# Patient Record
Sex: MALE | Race: WHITE | NOT HISPANIC OR LATINO | Employment: FULL TIME | URBAN - METROPOLITAN AREA
[De-identification: names, ages, dates, MRNs, and addresses within clinical notes are randomized per-mention and may not be internally consistent; named-entity substitution may affect disease eponyms.]

---

## 2018-06-26 ENCOUNTER — OFFICE VISIT (OUTPATIENT)
Dept: FAMILY MEDICINE CLINIC | Facility: CLINIC | Age: 33
End: 2018-06-26
Payer: COMMERCIAL

## 2018-06-26 VITALS
TEMPERATURE: 97.5 F | DIASTOLIC BLOOD PRESSURE: 76 MMHG | RESPIRATION RATE: 16 BRPM | HEIGHT: 69 IN | WEIGHT: 153.6 LBS | SYSTOLIC BLOOD PRESSURE: 118 MMHG | BODY MASS INDEX: 22.75 KG/M2 | HEART RATE: 67 BPM

## 2018-06-26 DIAGNOSIS — L29.0 PRURITUS ANI: ICD-10-CM

## 2018-06-26 DIAGNOSIS — Z00.00 ANNUAL PHYSICAL EXAM: Primary | ICD-10-CM

## 2018-06-26 PROCEDURE — 99395 PREV VISIT EST AGE 18-39: CPT | Performed by: FAMILY MEDICINE

## 2018-06-26 NOTE — ASSESSMENT & PLAN NOTE
Normal physical exam   advised to cut down on chocolate   hydrocortisone cream p r n      follow-up in 2 weeks in up if no improvement

## 2018-06-26 NOTE — PROGRESS NOTES
Assessment/Plan:    Problem List Items Addressed This Visit        Musculoskeletal and Integument    Pruritus ani      Normal physical exam   advised to cut down on chocolate   hydrocortisone cream p r n      follow-up in 2 weeks in up if no improvement            Other    Annual physical exam - Primary      Health  Male  follow-up in a year               Subjective:      Patient ID: Ambrocio Sanford is a 27 y o  male  26-year-old male with no medical history here for annual physical    Patient has been complaining of anal itchiness  for 7 days  Each and has occurs at night and last for 30 minutes  She has been applying Vaseline with some relief  No bleeding  No pain  No bloating  No diarrhea  No constipation    Recently patient has been eating much more chocolate than usual    he does not drink alcohol  No cigarette smoking  No illegal drugs  Sexually active with his wife  Has had previous  HIV test   Does not want to be checked now  family history of colon cancer in grandfather at the age of [de-identified]        The following portions of the patient's history were reviewed and updated as appropriate: allergies, current medications, past family history, past medical history, past social history, past surgical history and problem list     Review of Systems   Constitutional: Negative for activity change, appetite change, chills and fever  HENT: Negative for congestion  Respiratory: Negative for chest tightness  Cardiovascular: Negative for chest pain  Gastrointestinal: Negative for abdominal distention, abdominal pain, anal bleeding, blood in stool, constipation, diarrhea, nausea, rectal pain and vomiting  Rectal  itchiness   Musculoskeletal: Negative for arthralgias  Neurological: Negative for dizziness and headaches  Hematological: Negative for adenopathy  Psychiatric/Behavioral: Negative for agitation           Objective:    Vitals:    06/26/18 1515   BP: 118/76   Pulse: 67   Resp: 16   Temp: 97 5 °F (36 4 °C)        Physical Exam   Constitutional: He is oriented to person, place, and time  He appears well-developed and well-nourished  No distress  HENT:   Head: Normocephalic  Mouth/Throat: Oropharynx is clear and moist  No oropharyngeal exudate  Eyes: Conjunctivae are normal  Pupils are equal, round, and reactive to light  Neck: Normal range of motion  Cardiovascular: Normal rate, regular rhythm, normal heart sounds and intact distal pulses  Exam reveals no gallop and no friction rub  No murmur heard  Pulmonary/Chest: Effort normal and breath sounds normal  No respiratory distress  He has no wheezes  He has no rales  He exhibits no tenderness  Abdominal: Soft  He exhibits no distension and no mass  There is no tenderness  There is no rebound and no guarding  Genitourinary: Rectum normal and prostate normal  Rectal exam shows no external hemorrhoid, no internal hemorrhoid, no fissure, no mass, no tenderness and anal tone normal  Prostate is not enlarged and not tender  Musculoskeletal: Normal range of motion  He exhibits no edema, tenderness or deformity  Lymphadenopathy:     He has no cervical adenopathy  Neurological: He is alert and oriented to person, place, and time  Skin: Skin is warm and dry  No rash noted  He is not diaphoretic  No pallor  Psychiatric: He has a normal mood and affect  Vitals reviewed

## 2021-03-31 ENCOUNTER — TELEPHONE (OUTPATIENT)
Dept: OBGYN CLINIC | Facility: HOSPITAL | Age: 36
End: 2021-03-31

## 2021-03-31 ENCOUNTER — APPOINTMENT (OUTPATIENT)
Dept: RADIOLOGY | Age: 36
End: 2021-03-31
Payer: OTHER MISCELLANEOUS

## 2021-03-31 ENCOUNTER — TELEPHONE (OUTPATIENT)
Dept: OBGYN CLINIC | Facility: OTHER | Age: 36
End: 2021-03-31

## 2021-03-31 ENCOUNTER — OCCMED (OUTPATIENT)
Dept: URGENT CARE | Age: 36
End: 2021-03-31
Payer: OTHER MISCELLANEOUS

## 2021-03-31 DIAGNOSIS — S99.922A FOOT INJURY, LEFT, INITIAL ENCOUNTER: Primary | ICD-10-CM

## 2021-03-31 DIAGNOSIS — S99.922A FOOT INJURY, LEFT, INITIAL ENCOUNTER: ICD-10-CM

## 2021-03-31 PROCEDURE — 99283 EMERGENCY DEPT VISIT LOW MDM: CPT | Performed by: NURSE PRACTITIONER

## 2021-03-31 PROCEDURE — G0382 LEV 3 HOSP TYPE B ED VISIT: HCPCS | Performed by: NURSE PRACTITIONER

## 2021-03-31 PROCEDURE — 73630 X-RAY EXAM OF FOOT: CPT

## 2021-03-31 NOTE — TELEPHONE ENCOUNTER
Spoke to Abhi and scheduled appointment  Called patient, ROSA on VM for him to please call to confirm the appointment is okay with him  Please confirm the appointment and COVID screen when call returned   Thank you

## 2021-03-31 NOTE — TELEPHONE ENCOUNTER
Patient returned call and confirmed appointment and screened  Patient asked if he can take the boot off and shower prior to his appointment  Spoke with triage nurse, he is to follow the instructions he was given but the jaime  Patient is going to check with jaime

## 2021-03-31 NOTE — TELEPHONE ENCOUNTER
Spoke to [facility]  Gil Robledo reports an open metatarsal fx of the L foot  Stated patient is stabilized  Can you please look at the xray and advise when you would like him seen  This is a crushing WC injury  Thanks      After nurse spoke to Gil Robledo, she called back and reported that this is not an open fx, it is a closed fx  Please advise if you want him seen sooner than next available next week       CB# for Gil Robledo is [phone]    Thanks

## 2021-04-06 ENCOUNTER — OFFICE VISIT (OUTPATIENT)
Dept: OBGYN CLINIC | Facility: CLINIC | Age: 36
End: 2021-04-06
Payer: OTHER MISCELLANEOUS

## 2021-04-06 VITALS
BODY MASS INDEX: 23.19 KG/M2 | HEART RATE: 97 BPM | SYSTOLIC BLOOD PRESSURE: 137 MMHG | DIASTOLIC BLOOD PRESSURE: 85 MMHG | HEIGHT: 68 IN | WEIGHT: 153 LBS

## 2021-04-06 DIAGNOSIS — S92.325A CLOSED NONDISPLACED FRACTURE OF SECOND METATARSAL BONE OF LEFT FOOT, INITIAL ENCOUNTER: ICD-10-CM

## 2021-04-06 DIAGNOSIS — S97.82XA CRUSH INJURY TO FOOT, LEFT, INITIAL ENCOUNTER: Primary | ICD-10-CM

## 2021-04-06 DIAGNOSIS — S92.315A CLOSED NONDISPLACED FRACTURE OF FIRST METATARSAL BONE OF LEFT FOOT, INITIAL ENCOUNTER: ICD-10-CM

## 2021-04-06 PROCEDURE — 28470 CLTX METATARSAL FX WO MNP EA: CPT | Performed by: ORTHOPAEDIC SURGERY

## 2021-04-06 PROCEDURE — 99243 OFF/OP CNSLTJ NEW/EST LOW 30: CPT | Performed by: ORTHOPAEDIC SURGERY

## 2021-04-06 RX ORDER — ASPIRIN 325 MG
325 TABLET, DELAYED RELEASE (ENTERIC COATED) ORAL DAILY
Qty: 30 TABLET | Refills: 0 | Status: SHIPPED | OUTPATIENT
Start: 2021-04-06

## 2021-04-06 NOTE — LETTER
April 6, 2021     Patient: Kiran Hamilton   YOB: 1985   Date of Visit: 4/6/2021       To Whom it May Concern:    Byron Sullivan is under my professional care  He was seen in my office on 4/6/2021  He is not cleared to return to work at this time  We will update his work status at the next evaluation in 2 weeks  If you have any questions or concerns, please don't hesitate to call  Sincerely,          Jarrett Grace MD        CC: Jayson Potts D Florentina Claude

## 2021-04-06 NOTE — PROGRESS NOTES
RUSS Garcia  Attending, Orthopaedic Surgery  [facility] [address] [facility]      ORTHOPAEDIC FOOT AND ANKLE CLINIC VISIT     Assessment:     Encounter Diagnoses   Name Primary?  Crush injury to foot, left, initial encounter Yes    Closed nondisplaced fracture of first metatarsal bone of left foot, initial encounter     Closed nondisplaced fracture of second metatarsal bone of left foot, initial encounter             Plan:   · The patient verbalized understanding of exam findings and treatment plan  We engaged in the shared decision-making process and treatment options were discussed at length with the patient  Surgical and conservative management discussed today along with risks and benefits  · Jenn Lanes is to remain nonweightbearing in the cam boot for another 2 weeks with the use of crutches and cam boot  · Advised due to crush injury there is a possibility of nerve damage and will have burning, tingling, and numbness as the nerve heals- Dorsal medial cutaneus branch of the SPN  · Advised bone healing can take up to 4 months to return to work without restrictions  · Begin taking vitamin D3 and calcium to promote bone healing  · Avoid NSAID's as it hinders bone healing  ·  mg BID for ppx  · Compression stocking was prescribed to be worn to control swelling  · We will see the patient back in 2 weeks for an x-ray and possible return to weightbearing  Return in about 2 weeks (around 4/20/2021) for Recheck, x-ray's    Fracture / Dislocation Treatment    Date/Time: 4/6/2021 9:23 AM  Performed by: González Bautista MD  Authorized by: González Bautista MD     Patient Location:  Clinic  Other Assisting Provider: No    Verbal consent obtained?: Yes    Written consent obtained?: No    Risks and benefits: Risks, benefits and alternatives were discussed    Consent given by:  Patient  Patient states understanding of procedure being performed: Yes    Patient identity confirmed: Verbally with patient  Injury location:  Foot  Location details:  Left foot  Injury type:  Fracture  Fracture type: first metatarsal and second metatarsal    Neurovascular status: Neurovascularly intact    Distal perfusion: normal    Neurological function: normal    Range of motion: normal    Local anesthesia used?: No    Manipulation performed?: No    Immobilization:  Brace and crutches (Cam boot)  Neurovascular status: Neurovascularly intact    Distal perfusion: normal    Neurological function: normal    Range of motion: normal    Patient tolerance:  Patient tolerated the procedure well with no immediate complications          History of Present Illness:   Chief Complaint:   Chief Complaint   Patient presents with    Left Foot - Pain, Fracture     Colon Breath is a 38 y o  male who is being seen for left foot pain  Patient was seen by Junaid on 3/31/21 for a work related injury on 3/3021  Patient states [id number] piece of lumber fell of a cart and fell on his left foot  Patient states he was given a cam boot at [facility] and crutches and to remain nonweightbearing until seen by orthro  Pain is localized at 1st metatarsal with minimal radiating and described as sharp and severe  Patient denies numbness, tingling or radicular pain  Denies history of neuropathy  Patient does not smoke, does not have diabetes and does not take blood thinners  Patient denies family history of anesthesia complications and has not had any complications with anesthesia       Pain/symptom timing:  Worse during the day when active  Pain/symptom context:  Worse with activites and work  Pain/symptom modifying factors:  Rest makes better, activities make worse  Pain/symptom associated signs/symptoms: none    Prior treatment   · NSAIDsYes   · Injections No   · Bracing/Orthotics Yes    · Physical Therapy No     Orthopedic Surgical History:   See Below    Past Medical, Surgical and Social History:  Past Medical History:  has no past medical history on file  Problem List: does not have any pertinent problems on file  Past Surgical History:  has no past surgical history on file  Family History: family history is not on file  Social History:  has no history on file for tobacco, alcohol, and drug  Current Medications: currently has no medications in their medication list   Allergies: has No Known Allergies  Review of Systems:  General- denies fever/chills  HEENT- denies hearing loss or sore throat  Eyes- denies eye pain or visual disturbances, denies red eyes  Respiratory- denies cough or SOB  Cardio- denies chest pain or palpitations  GI- denies abdominal pain  Endocrine- denies urinary frequency  Urinary- denies pain with urination  Musculoskeletal- Negative except noted above  Skin- denies rashes or wounds  Neurological- denies dizziness or headache  Psychiatric- denies anxiety or difficulty concentrating    Physical Exam:   /85   Pulse 97   Ht 5' 8" (1 727 m)   Wt 69 4 kg (153 lb)   BMI 23 26 kg/m²   General/Constitutional: No apparent distress: well-nourished and well developed  Eyes: normal ocular motion  Cardio: RRR, Normal S1S2, No m/r/g  Lymphatic: No appreciable lymphadenopathy  Respiratory: Non-labored breathing, CTA b/l no w/c/r  Vascular: No edema, swelling or tenderness, except as noted in detailed exam   Integumentary: No impressive skin lesions present, except as noted in detailed exam   Neuro: No ataxia or tremors noted  Psych: Normal mood and affect, oriented to person, place and time  Appropriate affect  Musculoskeletal: Normal, except as noted in detailed exam and in HPI      Examination    Left    Gait Antalgic   Musculoskeletal Tender to palpation at 1st metatarsal    Skin Normal       Nails Normal    Range of Motion  15 degrees dorsiflexion, 35 degrees plantarflexion  Subtalar motion: normal    Stability Stable    Muscle Strength 5/5 tibialis anterior  5/5 gastrocnemius-soleus  5/5 posterior tibialis  5/5 peroneal/eversion strength  5/5 EHL  5/5 FHL    Neurologic Normal    Sensation Intact to light touch throughout sural, saphenous, superficial peroneal, deep peroneal and medial/lateral plantar nerve distributions  [place]-Ellis 5 07 filament (10g) testing  deferred  Cardiovascular Brisk capillary refill < 2 seconds,intact DP and PT pulses    Special Tests None      Imaging Studies:   3 views of the left foot were taken, reviewed and interpreted independently that demonstrate 1st and 2nd metatarsal nondisplaced fractures  Reviewed by me personally  Thersa Quay Lachman, MD  Foot & Ankle Surgery   Department of [address] [place] [address]      I personally performed the service  Thersa Quay Lachman, MD    Scribe Attestation    I,:  Kirsten Mattson MA am acting as a scribe while in the presence of the attending physician :       I,:  Jonathan De La Cruz MD personally performed the services described in this documentation    as scribed in my presence :

## 2021-04-20 ENCOUNTER — OFFICE VISIT (OUTPATIENT)
Dept: OBGYN CLINIC | Facility: CLINIC | Age: 36
End: 2021-04-20

## 2021-04-20 ENCOUNTER — APPOINTMENT (OUTPATIENT)
Dept: RADIOLOGY | Facility: AMBULARY SURGERY CENTER | Age: 36
End: 2021-04-20
Attending: ORTHOPAEDIC SURGERY
Payer: OTHER MISCELLANEOUS

## 2021-04-20 ENCOUNTER — TELEPHONE (OUTPATIENT)
Dept: OBGYN CLINIC | Facility: HOSPITAL | Age: 36
End: 2021-04-20

## 2021-04-20 VITALS
SYSTOLIC BLOOD PRESSURE: 128 MMHG | HEART RATE: 65 BPM | DIASTOLIC BLOOD PRESSURE: 89 MMHG | WEIGHT: 153 LBS | BODY MASS INDEX: 23.19 KG/M2 | HEIGHT: 68 IN

## 2021-04-20 DIAGNOSIS — S92.325A CLOSED NONDISPLACED FRACTURE OF SECOND METATARSAL BONE OF LEFT FOOT, INITIAL ENCOUNTER: ICD-10-CM

## 2021-04-20 DIAGNOSIS — S97.82XA CRUSH INJURY TO FOOT, LEFT, INITIAL ENCOUNTER: ICD-10-CM

## 2021-04-20 DIAGNOSIS — S92.315D CLOSED NONDISPLACED FRACTURE OF FIRST METATARSAL BONE OF LEFT FOOT WITH ROUTINE HEALING, SUBSEQUENT ENCOUNTER: ICD-10-CM

## 2021-04-20 DIAGNOSIS — S97.82XD CRUSHING INJURY OF LEFT FOOT, SUBSEQUENT ENCOUNTER: Primary | ICD-10-CM

## 2021-04-20 DIAGNOSIS — S92.315A CLOSED NONDISPLACED FRACTURE OF FIRST METATARSAL BONE OF LEFT FOOT, INITIAL ENCOUNTER: ICD-10-CM

## 2021-04-20 PROCEDURE — 73630 X-RAY EXAM OF FOOT: CPT

## 2021-04-20 PROCEDURE — 99024 POSTOP FOLLOW-UP VISIT: CPT | Performed by: ORTHOPAEDIC SURGERY

## 2021-04-20 NOTE — PATIENT INSTRUCTIONS
Start 50% of weight today, 75% tomorrow and 100% on Friday (progress as tolerated)   Walk with the CAM boot at all times    Continue the aspirin for blood clot prevention    Continue Vitamin D and Calcium for bone healing    Follow up in 2 weeks for repeat Xrays and evaluation

## 2021-04-20 NOTE — TELEPHONE ENCOUNTER
Patient sees Dr Dexter Brannon is calling in from [facility] the  on the claim is wanting to know the last time the patient was seen in the office and if any upcoming appointment are made  He stating that sedimentary work is definitely available for this patient and are able to accommodate anything we request  Also requested office notes from 4/6 to be faxed over to him             Fax# [phone]

## 2021-04-20 NOTE — PROGRESS NOTES
RUSS Abrams  Attending, Orthopaedic Surgery  [facility] [address] [facility]      ORTHOPAEDIC FOOT AND ANKLE CLINIC VISIT     Assessment:     Encounter Diagnoses   Name Primary?  Crushing injury of left foot, subsequent encounter Yes    Closed nondisplaced fracture of first metatarsal bone of left foot with routine healing, subsequent encounter     Closed nondisplaced fracture of second metatarsal bone of left foot, initial encounter             Plan:   · The patient verbalized understanding of exam findings and treatment plan  We engaged in the shared decision-making process and treatment options were discussed at length with the patient  Surgical and conservative management discussed today along with risks and benefits  · Briseyda Lucia has 1st and 2nd metatarsal fractures after a crush injury 3 weeks ago  His weightbearing xrays done today show very good alignment with no displacement of the fractures  · He is to start weightbearing 50% and progress towards full weightbearing with the CAM boot by Friday  · Continue to take  for DVT ppx  · Continue Vitamin D and Calcium  · A new work letter was provided today  Return in about 2 weeks (around 5/4/2021) for new weightbearing xrays left foot upon arrival       History of Present Illness:   Chief Complaint:   Chief Complaint   Patient presents with   Ray Gallomargoth is a 38 y o  male who is being seen in follow-up for left 1st and 2nd metatarsals fractures  When we last saw he we recommended NWB in a cAM boot  Pain has slowly improved  Residual pain is localized at 1st and 2nd metatarsals with minimal radiating and described as sharp and severe        Pain/symptom timing:  Worse during the day when active  Pain/symptom context:  Worse with activites  Pain/symptom modifying factors:  Rest makes better, activities make worse  Pain/symptom associated signs/symptoms: none    Prior treatment   · NSAIDsNo   · Injections No · Bracing/Orthotics Yes    · Physical Therapy No     Orthopedic Surgical History:   See below    Past Medical, Surgical and Social History:  Past Medical History:  has no past medical history on file  Problem List: does not have any pertinent problems on file  Past Surgical History:  has no past surgical history on file  Family History: family history is not on file  Social History:  has no history on file for tobacco, alcohol, and drug  Current Medications: has a current medication list which includes the following prescription(s): aspirin  Allergies: has No Known Allergies  Review of Systems:  General- denies fever/chills  HEENT- denies hearing loss or sore throat  Eyes- denies eye pain or visual disturbances, denies red eyes  Respiratory- denies cough or SOB  Cardio- denies chest pain or palpitations  GI- denies abdominal pain  Endocrine- denies urinary frequency  Urinary- denies pain with urination  Musculoskeletal- Negative except noted above  Skin- denies rashes or wounds  Neurological- denies dizziness or headache  Psychiatric- denies anxiety or difficulty concentrating    Physical Exam:   /89   Pulse 65   Ht 5' 8" (1 727 m)   Wt 69 4 kg (153 lb)   BMI 23 26 kg/m²   General/Constitutional: No apparent distress: well-nourished and well developed  Eyes: normal ocular motion  Lymphatic: No appreciable lymphadenopathy  Respiratory: Non-labored breathing  Vascular: No edema, swelling or tenderness, except as noted in detailed exam   Integumentary: No impressive skin lesions present, except as noted in detailed exam   Neuro: No ataxia or tremors noted  Psych: Normal mood and affect, oriented to person, place and time  Appropriate affect  Musculoskeletal: Normal, except as noted in detailed exam and in HPI      Examination    Left    Gait                NWB   Musculoskeletal Tender to palpation at 1st metatarsal    Skin Normal  +swelling over metatarsals    Nails Normal    Range of Motion Intact DF, intact PF  Subtalar motion: intact    Stability Stable    Muscle Strength Not tested    Neurologic Normal    Sensation  Intact to light touch throughout sural, saphenous, superficial peroneal, deep peroneal and medial/lateral plantar nerve distributions  [place]-Ellis 5 07 filament (10g) testing deferred  Cardiovascular Brisk capillary refill < 2 seconds,intact DP and PT pulses    Special Tests None      Imaging Studies:   No new imaging    3 weightbearing views of the Left foot were obtained, reviewed and interpreted independently which demonstrate unchanged alignment of 1st metatarsal fracture, no interval displacement  Reviewed by me personally  Rockwell Fireman Lachman, MD  Foot & Ankle Surgery   Department of [address] [place] [address]      I personally performed the service  Rockwell Fireman Lachman, MD

## 2021-04-20 NOTE — LETTER
April 20, 2021     Patient: Amirah Murray   YOB: 1985   Date of Visit: 4/20/2021       To Whom it May Concern:    Monster Madera is under my professional care  He was seen in my office on 4/20/2021  He is not cleared to return to work and should be excused from work until reevaluated in my office in 2 weeks  If you have any questions or concerns, please don't hesitate to call  Sincerely,          Peña Joe MD        CC: Sonny Brown   [facility]

## 2021-04-22 ENCOUNTER — TELEPHONE (OUTPATIENT)
Dept: OBGYN CLINIC | Facility: HOSPITAL | Age: 36
End: 2021-04-22

## 2021-04-22 NOTE — TELEPHONE ENCOUNTER
Francisca Serrato, patient nurse  with [facility] is calling for the date of the last appt  Francisca Serrato would like the ovn & work note faxed to her at [phone], info was faxed

## 2021-05-04 ENCOUNTER — APPOINTMENT (OUTPATIENT)
Dept: RADIOLOGY | Facility: AMBULARY SURGERY CENTER | Age: 36
End: 2021-05-04
Attending: ORTHOPAEDIC SURGERY
Payer: OTHER MISCELLANEOUS

## 2021-05-04 ENCOUNTER — OFFICE VISIT (OUTPATIENT)
Dept: OBGYN CLINIC | Facility: CLINIC | Age: 36
End: 2021-05-04

## 2021-05-04 VITALS — WEIGHT: 153 LBS | BODY MASS INDEX: 23.19 KG/M2 | HEIGHT: 68 IN

## 2021-05-04 DIAGNOSIS — S92.315D CLOSED NONDISPLACED FRACTURE OF FIRST METATARSAL BONE OF LEFT FOOT WITH ROUTINE HEALING, SUBSEQUENT ENCOUNTER: ICD-10-CM

## 2021-05-04 DIAGNOSIS — S92.315D CLOSED NONDISPLACED FRACTURE OF FIRST METATARSAL BONE OF LEFT FOOT WITH ROUTINE HEALING, SUBSEQUENT ENCOUNTER: Primary | ICD-10-CM

## 2021-05-04 PROCEDURE — 99024 POSTOP FOLLOW-UP VISIT: CPT | Performed by: ORTHOPAEDIC SURGERY

## 2021-05-04 PROCEDURE — 73630 X-RAY EXAM OF FOOT: CPT

## 2021-05-04 NOTE — LETTER
May 4, 2021     Patient: Fernando Gutiérrez   YOB: 1985   Date of Visit: 5/4/2021       To Whom it May Concern:    Rocio Glaser is under my professional care  He was seen in my office on 5/4/2021  He is not cleared at this time to return to work without restrictions  He is cleared at this time for desk duty only, he is not permitted to perform any work while on his foot  We will see him back in 6 weeks for repeat evaluation  After a crush injury to a foot, it it typical for return to work without restrictions at 4 months after the injury (8/1/21)  If you have any questions or concerns, please don't hesitate to call  Sincerely,          Arlys Cogan, MD        CC: Fernando Rivera

## 2021-05-04 NOTE — PROGRESS NOTES
RUSS Riojas  Attending, Orthopaedic Surgery  [facility] [address] [facility]      ORTHOPAEDIC FOOT AND ANKLE CLINIC VISIT     Assessment:     Encounter Diagnosis   Name Primary?  Closed nondisplaced fracture of first metatarsal bone of left foot with routine healing, subsequent encounter Yes            Plan:   · The patient verbalized understanding of exam findings and treatment plan  We engaged in the shared decision-making process and treatment options were discussed at length with the patient  Surgical and conservative management discussed today along with risks and benefits  · Nonoperative treatment for left 1st and 2nd MT fractures  · Xrays stable today  Now 5 weeks from injury  · Protocol provided to begin weaning boot in 2 weeks and transitioning to a supportive sneaker  · Work note provided  · See back in 6 weeks for another Xray  History of Present Illness:   Chief Complaint: Left foot fractures  Sushma Burns is a 38 y o  male who is being seen in follow-up for left 1st and 2nd MT fractures  When we last saw he we recommended return to weightbearing protocol  Pain has continued to improve  Residual pain is localized at fracture sites with minimal radiating and described as occasional       Pain/symptom timing:  Worse during the day when active  Pain/symptom context:  Worse with activites and work  Pain/symptom modifying factors:  Rest makes better, activities make worse  Pain/symptom associated signs/symptoms: none    Prior treatment   · NSAIDsYes   · Injections No   · Bracing/Orthotics Yes    · Physical Therapy No     Orthopedic Surgical History:   See below    Past Medical, Surgical and Social History:  Past Medical History:  has no past medical history on file  Problem List: does not have any pertinent problems on file  Past Surgical History:  has no past surgical history on file  Family History: family history is not on file    Social History:  has no history on file for tobacco, alcohol, and drug  Current Medications: has a current medication list which includes the following prescription(s): aspirin  Allergies: has No Known Allergies  Review of Systems:  General- denies fever/chills  HEENT- denies hearing loss or sore throat  Eyes- denies eye pain or visual disturbances, denies red eyes  Respiratory- denies cough or SOB  Cardio- denies chest pain or palpitations  GI- denies abdominal pain  Endocrine- denies urinary frequency  Urinary- denies pain with urination  Musculoskeletal- Negative except noted above  Skin- denies rashes or wounds  Neurological- denies dizziness or headache  Psychiatric- denies anxiety or difficulty concentrating    Physical Exam:   There were no vitals taken for this visit  General/Constitutional: No apparent distress: well-nourished and well developed  Eyes: normal ocular motion  Lymphatic: No appreciable lymphadenopathy  Respiratory: Non-labored breathing  Vascular: No edema, swelling or tenderness, except as noted in detailed exam   Integumentary: No impressive skin lesions present, except as noted in detailed exam   Neuro: No ataxia or tremors noted  Psych: Normal mood and affect, oriented to person, place and time  Appropriate affect  Musculoskeletal: Normal, except as noted in detailed exam and in HPI  Examination    Left    Gait Antalgic   Musculoskeletal Tender to palpation at 1st MT neck area    Skin Normal       Nails Normal    Range of Motion  20 degrees dorsiflexion, 30 degrees plantarflexion  Subtalar motion: normal    Stability Stable    Muscle Strength 5/5 tibialis anterior  5/5 gastrocnemius-soleus  5/5 posterior tibialis  5/5 peroneal/eversion strength  5/5 EHL  5/5 FHL    Neurologic Normal    Sensation  Intact to light touch throughout sural, saphenous, superficial peroneal, deep peroneal and medial/lateral plantar nerve distributions  [place]-Ellis 5 07 filament (10g) testing deferred      Cardiovascular Brisk capillary refill < 2 seconds,intact DP and PT pulses    Special Tests None      Imaging Studies:   3 views of the Left foot were obtained, reviewed and interpreted independently which demonstrate 1st and 2nd MT fractures without interval displacement  Reviewed by me personally  Amanda Chroman Lachman, MD  Foot & Ankle Surgery   Department of [address] [place] [address]      I personally performed the service  Amanda Chroman Lachman, MD

## 2021-05-04 NOTE — PATIENT INSTRUCTIONS
3 weeks of weightbearing as tolerated in the CAM boot    You may begin weaning your boot and transitioning to a sneaker on 5/24  It is important to do this gradually to avoid aggravating the healing process  1  5/24, you may come out of the boot into a sneaker for 2 hours  2  5/25, you may come out of the boot into a sneaker for 4 hours,  3  The next day, you may come out of the boot into a sneaker for 6 hours  4  Continue this (adding 2 hours per day) as you tolerate  For example, if you do 6 hours out of the boot into a sneaker and your foot swells more than usual at night and it is difficult to control the discomfort, do not advance to 8 hours the next day, stay at 6 hours until you are able to tolerate it  Elevation, Ice and tylenol and staying off of it at night will be important to aide in this transition out of the boot  Swelling and soreness are normal as you begin to do more with the injured leg  Recommend taking the following supplements: Vitamin D 4000 units per day and Calcium 1200 mg per day  This will help with bone healing  Avoid NSAIDs like motrin/aleve  Compression stocking to aide in swelling control  20-30mm Hg pressure-Knee high  Continue to elevate and Ice as needed for swelling control    PT to begin immediately    Return to clinic in 6 weeks for repeat Xrays weightbearing

## 2021-05-06 ENCOUNTER — EVALUATION (OUTPATIENT)
Dept: PHYSICAL THERAPY | Facility: REHABILITATION | Age: 36
End: 2021-05-06
Payer: OTHER MISCELLANEOUS

## 2021-05-06 DIAGNOSIS — S92.315D CLOSED NONDISPLACED FRACTURE OF FIRST METATARSAL BONE OF LEFT FOOT WITH ROUTINE HEALING, SUBSEQUENT ENCOUNTER: Primary | ICD-10-CM

## 2021-05-06 PROCEDURE — 97110 THERAPEUTIC EXERCISES: CPT | Performed by: PHYSICAL THERAPIST

## 2021-05-06 PROCEDURE — 97161 PT EVAL LOW COMPLEX 20 MIN: CPT | Performed by: PHYSICAL THERAPIST

## 2021-05-06 NOTE — PROGRESS NOTES
PT Evaluation     Today's date: 2021  Patient name: Hayes Eric  : 1985  MRN: [id number]  Referring provider: Krystal Rick MD  Dx:   Encounter Diagnosis     ICD-10-CM    1  Closed nondisplaced fracture of first metatarsal bone of left foot with routine healing, subsequent encounter  S99 315D Ambulatory referral to Physical Therapy                  Assessment  Assessment details: Pt is a pleasant 38 y o  male presenting to outpatient physical therapy with Closed nondisplaced fracture of first metatarsal bone of left foot with routine healing, subsequent encounter  (primary encounter diagnosis)   Pt presents with pain, decreased range of motion, decreased strength, abnormal gait mechanics, impaired static and dynamic balance, as well as decreased tolerance to activity  Pt is a good candidate for outpatient physical therapy and would benefit from skilled physical therapy to address limitations and to achieve goals  Thank you for this referral    Impairments: abnormal coordination, abnormal gait, abnormal or restricted ROM, activity intolerance, impaired balance, impaired physical strength, pain with function and weight-bearing intolerance  Understanding of Dx/Px/POC: good   Prognosis: good    Goals  ST  Patient will be able to ambulate community distances without limitation or gait abnormalities in 4 weeks  2  Patient will demonstrate 25% improvement in ROM in 4 weeks  3  Patient will demonstrate 1/2 grade improvement in strength in 4 weeks  LT  Patient will be able to perform IADLS without restriction or pain by discharge  2  Patient will be independent in HEP by discharge  3  Patient will be able to return to recreational/work duties without restriction or pain by discharge        Plan  Patient would benefit from: PT eval and skilled PT  Planned modality interventions: cryotherapy and thermotherapy: hydrocollator packs  Planned therapy interventions: IADL retraining, body mechanics training, flexibility, functional ROM exercises, home exercise program, neuromuscular re-education, manual therapy, postural training, strengthening, stretching, therapeutic activities, therapeutic exercise, joint mobilization, balance/weight bearing training and patient education  Frequency: 2x week  Duration in visits: 8  Duration in weeks: 4  Treatment plan discussed with: patient        Subjective Evaluation    History of Present Illness  Mechanism of injury: 21  Pt comes to therapy reporting injury to L foot while at work  States he was examined at urgent care the following day, where fractures were revealed  Followed up with orthopedic physician  Recently cleared and advised to ambulate in CAM boot as tolerated without crutches  Reports mild pain along dorsum of foot  Denies paresthesias      Pain  Current pain ratin  At worst pain rating: 3    Patient Goals  Patient goals for therapy: decreased pain, increased motion, independence with ADLs/IADLs, increased strength, improved balance and return to work          Objective     Active Range of Motion     Right Ankle/Foot   Dorsiflexion (ke): 20 degrees   Plantar flexion: 38 degrees   Inversion: 30 degrees   Eversion: -5 degrees     Passive Range of Motion     Right Ankle/Foot    Dorsiflexion (ke): 30 degrees   Plantar flexion: 40 degrees   Inversion: 35 degrees   Eversion: 10 degrees     Strength/Myotome Testing     Right Ankle/Foot   Dorsiflexion: 4+  Plantar flexion: 4  Inversion: 4-  Eversion: 4-    Tests     Additional Tests Details  21  Gait - CAM boot full weight             Precautions: Weight-bearing protocol     Daily Treatment Diary     Date             FOTO IE             Re-eval IE              Manuals             PROM ankle                                                    Neuro Re-Ed     rockerboard              Biodex-[facility]             Biodex-             Tandem balance Ther Ex    BAPS             TB ankle all HEP            Prone ankle PF c weight                                       Ther Activity    bike                          Gait Training                              Modalities    CP PRN

## 2021-05-07 ENCOUNTER — TELEPHONE (OUTPATIENT)
Dept: OBGYN CLINIC | Facility: HOSPITAL | Age: 36
End: 2021-05-07

## 2021-05-07 NOTE — TELEPHONE ENCOUNTER
Patient sees Dr Lizzy Anand from [facility] requesting work letter status to please be fax  Fax # [phone]    I already sent OVN from 5/04

## 2021-05-11 ENCOUNTER — OFFICE VISIT (OUTPATIENT)
Dept: PHYSICAL THERAPY | Facility: REHABILITATION | Age: 36
End: 2021-05-11
Payer: OTHER MISCELLANEOUS

## 2021-05-11 DIAGNOSIS — S92.315D CLOSED NONDISPLACED FRACTURE OF FIRST METATARSAL BONE OF LEFT FOOT WITH ROUTINE HEALING, SUBSEQUENT ENCOUNTER: Primary | ICD-10-CM

## 2021-05-11 PROCEDURE — 97140 MANUAL THERAPY 1/> REGIONS: CPT

## 2021-05-11 PROCEDURE — 97110 THERAPEUTIC EXERCISES: CPT

## 2021-05-11 PROCEDURE — 97112 NEUROMUSCULAR REEDUCATION: CPT

## 2021-05-11 NOTE — PROGRESS NOTES
Daily Note     Today's date: 2021  Patient name: Enma Cha  : 1985  MRN: [id number]  Referring provider: Aruna Kaba MD  Dx:   Encounter Diagnosis     ICD-10-CM    1  Closed nondisplaced fracture of first metatarsal bone of left foot with routine healing, subsequent encounter  [id number]                   Subjective: pt presents to therapy with CAM boot reporting pain with WB only  He noted compliance with HEP and denied adverse reaction following last visit  Objective: See treatment diary below      Assessment: Tolerated addition of exercises and treatment well  Patient demonstrated fatigue post treatment, exhibited good technique with therapeutic exercises and would benefit from continued PT      Plan: Continue per plan of care  Progress treatment as tolerated         Precautions: Weight-bearing protocol     Daily Treatment Diary     Date            FOTO IE             Re-eval IE              Manuals            PROM ankle  TE                                                  Neuro Re-Ed     rockerboard   x20 ea PF/DF  M/L           Biodex-LOS  1x static 1x L12           Biodex-WS  PF/DF x2 L12  M/L L 12 x2           Tandem balance  EO x30" ea EC 2x30" ea                                                  Ther Ex    BAPS  L2 x20 ea           TB ankle all HEP            Prone ankle PF c weight  nv                                     Ther Activity    bike  5 min                        Gait Training                              Modalities    CP PRN

## 2021-05-13 ENCOUNTER — TELEPHONE (OUTPATIENT)
Dept: OBGYN CLINIC | Facility: HOSPITAL | Age: 36
End: 2021-05-13

## 2021-05-13 NOTE — TELEPHONE ENCOUNTER
DR Lachman  RE: Jeffery Lopez # [phone] [id number] [phone]    Patients , Juan Carlos Melgoza, called to speak to Dr Garcia Early team  Juan Carlos Melgoza states patient needs to wear closed toes shoes/boots at work    Juan Carlos Melgoza would like to know if there is a closed toe boot that Dr Kelvin Mandujano can order for patient

## 2021-05-13 NOTE — TELEPHONE ENCOUNTER
Called Rika Saunders back but no answer  Left a voicemail stating that there is no closed toed boot option for him  He will be weaned out of the boot by 5/31/21   She may call back if she has any further questions

## 2021-05-13 NOTE — TELEPHONE ENCOUNTER
Christine,    There is not a closed toed boot for him  Although he will be weaned from the boot and into a sneaker by 5/31  Can you reach out to them?

## 2021-05-14 ENCOUNTER — OFFICE VISIT (OUTPATIENT)
Dept: PHYSICAL THERAPY | Facility: REHABILITATION | Age: 36
End: 2021-05-14
Payer: OTHER MISCELLANEOUS

## 2021-05-14 DIAGNOSIS — S92.315D CLOSED NONDISPLACED FRACTURE OF FIRST METATARSAL BONE OF LEFT FOOT WITH ROUTINE HEALING, SUBSEQUENT ENCOUNTER: Primary | ICD-10-CM

## 2021-05-14 PROCEDURE — 97140 MANUAL THERAPY 1/> REGIONS: CPT | Performed by: PHYSICAL THERAPIST

## 2021-05-14 PROCEDURE — 97110 THERAPEUTIC EXERCISES: CPT | Performed by: PHYSICAL THERAPIST

## 2021-05-14 PROCEDURE — 97112 NEUROMUSCULAR REEDUCATION: CPT | Performed by: PHYSICAL THERAPIST

## 2021-05-14 PROCEDURE — 97530 THERAPEUTIC ACTIVITIES: CPT | Performed by: PHYSICAL THERAPIST

## 2021-05-14 NOTE — PROGRESS NOTES
Daily Note     Today's date: 2021  Patient name: Iman Ochoa  : 1985  MRN: [id number]  Referring provider: Charlene Gomez MD  Dx:   Encounter Diagnosis     ICD-10-CM    1  Closed nondisplaced fracture of first metatarsal bone of left foot with routine healing, subsequent encounter  C63 301R                   Subjective: Pt comes to therapy reporting mild soreness with ambulation and excessive activity, otherwise denies discomfort or difficulty performing HEP  Denies discomfort following last treatment session  Objective: See treatment diary below      Assessment: Tolerated treatment well  Pt demonstrates good weight-bearing tolerance in CAM boot  Minor cuing with Biodex  Patient exhibited good technique with therapeutic exercises and would benefit from continued PT      Plan: Progress treatment as tolerated         Precautions: Weight-bearing protocol     Daily Treatment Diary     Date           FOTO IE             Re-eval IE              Manuals           PROM ankle  TE MARCIO                                                 Neuro Re-Ed     rockerboard   x20 ea PF/DF  M/L x30 ea PF/DF  M/L          Biodex-LOS  1x static 1x L12 1x static 1x L12          Biodex-WS  AP & ML  PF/DF x2 L12  M/L L 12 x2 Static x1 ea, Lv 12 x1 ea          Tandem balance  EO x30" ea EC 2x30" ea EO 1x30" ea EC 2x30" ea                                                 Ther Ex    BAPS  L2 x20 ea nv          TB ankle all HEP            Prone ankle PF c weight   nv                                    Ther Activity    bike  5 min 8 min                       Gait Training                              Modalities    CP PRN

## 2021-05-18 ENCOUNTER — OFFICE VISIT (OUTPATIENT)
Dept: PHYSICAL THERAPY | Facility: REHABILITATION | Age: 36
End: 2021-05-18
Payer: OTHER MISCELLANEOUS

## 2021-05-18 DIAGNOSIS — S92.315D CLOSED NONDISPLACED FRACTURE OF FIRST METATARSAL BONE OF LEFT FOOT WITH ROUTINE HEALING, SUBSEQUENT ENCOUNTER: Primary | ICD-10-CM

## 2021-05-18 PROCEDURE — 97112 NEUROMUSCULAR REEDUCATION: CPT

## 2021-05-18 PROCEDURE — 97110 THERAPEUTIC EXERCISES: CPT

## 2021-05-18 PROCEDURE — 97140 MANUAL THERAPY 1/> REGIONS: CPT

## 2021-05-18 NOTE — PROGRESS NOTES
Daily Note     Today's date: 2021  Patient name: Blaine Glover  : 1985  MRN: [id number]  Referring provider: Marita Cai MD  Dx:   Encounter Diagnosis     ICD-10-CM    1  Closed nondisplaced fracture of first metatarsal bone of left foot with routine healing, subsequent encounter  E51 345O                   Subjective: pt reports pain/soreness in 1st met to touch and increased pain with prolonged walking  Objective: See treatment diary below      Assessment: Tolerated treatment well  Most challenged with tandem with EC  Good control with biodex activities  Patient demonstrated fatigue post treatment, exhibited good technique with therapeutic exercises and would benefit from continued PT  Plan: Continue per plan of care  Progress treatment as tolerated         Precautions: Weight-bearing protocol     Daily Treatment Diary     Date          FOTO IE             Re-eval IE              Manuals          PROM ankle  TE MARCIO TE                                                Neuro Re-Ed     rockerboard   x20 ea PF/DF  M/L x30 ea PF/DF  M/L x30 ea PF/DF  M/L         Biodex-LOS  1x static 1x L12 1x static 1x L12 Lv 12  x1 ea         Biodex-WS  AP & ML  PF/DF x2 L12  M/L L 12 x2 Static x1 ea, Lv 12 x1 ea Lv 12  x2          Tandem balance  EO x30" ea EC 2x30" ea EO 1x30" ea EC 2x30" ea EO 1x30" ea EC 2x30" ea                                                Ther Ex    BAPS  L2 x20 ea nv L2 x20 ea         TB ankle all HEP            Prone ankle PF c weight   nv 2# 5"x10                                   Ther Activity    bike  5 min 8 min 8 min                      Gait Training                              Modalities    CP PRN

## 2021-05-20 ENCOUNTER — OFFICE VISIT (OUTPATIENT)
Dept: PHYSICAL THERAPY | Facility: REHABILITATION | Age: 36
End: 2021-05-20
Payer: OTHER MISCELLANEOUS

## 2021-05-20 DIAGNOSIS — S92.315D CLOSED NONDISPLACED FRACTURE OF FIRST METATARSAL BONE OF LEFT FOOT WITH ROUTINE HEALING, SUBSEQUENT ENCOUNTER: Primary | ICD-10-CM

## 2021-05-20 PROCEDURE — 97110 THERAPEUTIC EXERCISES: CPT | Performed by: PHYSICAL THERAPIST

## 2021-05-20 PROCEDURE — 97140 MANUAL THERAPY 1/> REGIONS: CPT | Performed by: PHYSICAL THERAPIST

## 2021-05-20 PROCEDURE — 97112 NEUROMUSCULAR REEDUCATION: CPT | Performed by: PHYSICAL THERAPIST

## 2021-05-20 NOTE — PROGRESS NOTES
Daily Note     Today's date: 2021  Patient name: Daryl Sosa  : 1985  MRN: [id number]  Referring provider: Monica Senior MD  Dx:   Encounter Diagnosis     ICD-10-CM    1  Closed nondisplaced fracture of first metatarsal bone of left foot with routine healing, subsequent encounter  C64 031L                   Subjective: Pt comes to therapy stating he feels his weight-bearing tolerance and functional levels are continuing to improve  Reports he received his compression stockings in the mail recently, which he reports feels has been helping with pain and swelling  Objective: See treatment diary below      Assessment: Tolerated treatment well  Patient demonstrated fatigue post treatment, exhibited good technique with therapeutic exercises and would benefit from continued PT      Plan: Progress treatment as tolerated         Precautions: Weight-bearing protocol     Daily Treatment Diary     Date         FOTO IE     perf        Re-eval IE              Manuals         PROM ankle  TE MARCIO TE MARCIO                                               Neuro Re-Ed     rockerboard   x20 ea PF/DF  M/L x30 ea PF/DF  M/L x30 ea PF/DF  M/L x30 ea PF/DF  M/L        Biodex-LOS  1x static 1x L12 1x static 1x L12 Lv 12  x1 ea Lv12 x2  UE sup        Biodex-WS  AP & ML  PF/DF x2 L12  M/L L 12 x2 Static x1 ea, Lv 12 x1 ea Lv 12  x2  Lv 12  x2 ea        Biodex - random     Static med  1' x2        Tandem balance  EO x30" ea EC 2x30" ea EO 1x30" ea EC 2x30" ea EO 1x30" ea EC 2x30" ea EO 1x30" ea EC 2x30" ea        Tandem & side stepping     Foam 5 laps ea                                  Ther Ex    BAPS  L2 x20 ea nv L2 x20 ea L2 2 5# x30 ea        TB ankle all HEP    purp x30 ea        Prone ankle PF c weight   nv 2# 5"x10 np                                  Ther Activity    bike  5 min 8 min 8 min 10 min                     Gait Training                              Modalities    CP PRN

## 2021-05-25 ENCOUNTER — OFFICE VISIT (OUTPATIENT)
Dept: PHYSICAL THERAPY | Facility: REHABILITATION | Age: 36
End: 2021-05-25
Payer: OTHER MISCELLANEOUS

## 2021-05-25 DIAGNOSIS — S92.315D CLOSED NONDISPLACED FRACTURE OF FIRST METATARSAL BONE OF LEFT FOOT WITH ROUTINE HEALING, SUBSEQUENT ENCOUNTER: Primary | ICD-10-CM

## 2021-05-25 PROCEDURE — 97530 THERAPEUTIC ACTIVITIES: CPT | Performed by: PHYSICAL THERAPIST

## 2021-05-25 PROCEDURE — 97112 NEUROMUSCULAR REEDUCATION: CPT | Performed by: PHYSICAL THERAPIST

## 2021-05-25 PROCEDURE — 97110 THERAPEUTIC EXERCISES: CPT | Performed by: PHYSICAL THERAPIST

## 2021-05-25 PROCEDURE — 97140 MANUAL THERAPY 1/> REGIONS: CPT | Performed by: PHYSICAL THERAPIST

## 2021-05-25 NOTE — PROGRESS NOTES
Daily Note     Today's date: 2021  Patient name: Raghu Lopez  : 1985  MRN: [id number]  Referring provider: Na Munguia MD  Dx:   Encounter Diagnosis     ICD-10-CM    1  Closed nondisplaced fracture of first metatarsal bone of left foot with routine healing, subsequent encounter  S22 440F                   Subjective: Pt states he begun weaning out of the boot yesterday (2021) and reports his foot is feeling better, but mainly having pain when taking off his shoes  Pt asked about biking on the trail to help incorporate exercising outside of sessions  Objective: See treatment diary below      Assessment: Tolerated treatment well  Patient exhibited good technique with therapeutic exercises and would benefit from continued PT  Pt reported slight pain at the end of session with TB 4 way ankle  Plan: Continue per plan of care  Progress treatment as tolerated         Precautions: Weight-bearing protocol     Daily Treatment Diary     Date        FOTO IE     perf        Re-eval IE              Manuals        PROM ankle  TE MARCIO TE MARCIO JG                                              Neuro Re-Ed     rockerboard   x20 ea PF/DF  M/L x30 ea PF/DF  M/L x30 ea PF/DF  M/L x30 ea PF/DF  M/L x30 ea  PF/DF  M/       Biodex-LOS  1x static 1x L12 1x static 1x L12 Lv 12  x1 ea Lv12 x2  UE sup Lv 10 x2 UE sup       Biodex-WS  AP & ML  PF/DF x2 L12  M/L L 12 x2 Static x1 ea, Lv 12 x1 ea Lv 12  x2  Lv 12  x2 ea Lv 10 x2 ea       Biodex - random     Static med  1' x2 Static med   1' x2       Tandem balance  EO x30" ea EC 2x30" ea EO 1x30" ea EC 2x30" ea EO 1x30" ea EC 2x30" ea EO 1x30" ea EC 2x30" ea Foam EO 1x 30" ea  EC 2x30" ea         Tandem & side stepping     Foam 5 laps ea Foam 5 laps ea                                 Ther Ex    BAPS  L2 x20 ea nv L2 x20 ea L2 2 5# x30 ea np       TB ankle all HEP    purp x30 ea purp  x30ea       Prone ankle PF c weight   nv 2# 5"x10 np np                                 Ther Activity    bike  5 min 8 min 8 min 10 min L3, 10 min                    Gait Training                              Modalities    CP PRN

## 2021-05-27 ENCOUNTER — OFFICE VISIT (OUTPATIENT)
Dept: PHYSICAL THERAPY | Facility: REHABILITATION | Age: 36
End: 2021-05-27
Payer: OTHER MISCELLANEOUS

## 2021-05-27 DIAGNOSIS — S92.315D CLOSED NONDISPLACED FRACTURE OF FIRST METATARSAL BONE OF LEFT FOOT WITH ROUTINE HEALING, SUBSEQUENT ENCOUNTER: Primary | ICD-10-CM

## 2021-05-27 PROCEDURE — 97110 THERAPEUTIC EXERCISES: CPT | Performed by: PHYSICAL THERAPIST

## 2021-05-27 PROCEDURE — 97140 MANUAL THERAPY 1/> REGIONS: CPT | Performed by: PHYSICAL THERAPIST

## 2021-05-27 PROCEDURE — 97112 NEUROMUSCULAR REEDUCATION: CPT | Performed by: PHYSICAL THERAPIST

## 2021-05-27 PROCEDURE — 97530 THERAPEUTIC ACTIVITIES: CPT | Performed by: PHYSICAL THERAPIST

## 2021-05-27 NOTE — PROGRESS NOTES
Daily Note     Today's date: 2021  Patient name: Sacha Drew  : 1985  MRN: [id number]  Referring provider: Jose Enrique Cifuentes MD  Dx:   Encounter Diagnosis     ICD-10-CM    1  Closed nondisplaced fracture of first metatarsal bone of left foot with routine healing, subsequent encounter  S92 315D        Start Time:   Stop Time: 0950  Total time in clinic (min): 60 minutes    Subjective: Pt reports to therapy stating he has progressed weaning out of the boot to about 10 hours with no adverse effects and is experiencing less pain with taking his shoes off  He reports he trialed riding his bike around the block yesterday with no pain  Objective: See treatment diary below      Assessment: Tolerated treatment well  Patient demonstrated fatigue post treatment, exhibited good technique with therapeutic exercises and would benefit from continued PT  Pt stated slight discomfort/stretch during DF of TB exercise  Plan: Continue per plan of care  Progress treatment as tolerated         Precautions: Weight-bearing protocol     Daily Treatment Diary     Date       FOTO IE     perf        Re-eval IE              Manuals       PROM ankle  TE MARCIO TE MARCIO JG JG                                             Neuro Re-Ed     rockerboard   x20 ea PF/DF  M/L x30 ea PF/DF  M/L x30 ea PF/DF  M/L x30 ea PF/DF  M/L x30 ea  PF/DF  M/L x30 ea   PF/DF  M/L       Biodex-LOS  1x static 1x L12 1x static 1x L12 Lv 12  x1 ea Lv12 x2  UE sup Lv 10 x2 UE sup Lv8 x2 UE sup      Biodex-WS  AP & ML  PF/DF x2 L12  M/L L 12 x2 Static x1 ea, Lv 12 x1 ea Lv 12  x2  Lv 12  x2 ea Lv 10 x2 ea Lv 10 x2, ML  Lv9 x2, AP      Biodex - random     Static med  1' x2 Static med   1' x2 Static med   1' x2      Tandem balance  EO x30" ea EC 2x30" ea EO 1x30" ea EC 2x30" ea EO 1x30" ea EC 2x30" ea EO 1x30" ea EC 2x30" ea Foam EO 1x 30" ea  EC 2x30" ea   Foam   EO 1x 30" ea  EC 2x30" ea Tandem & side stepping     Foam 5 laps ea Foam 5 laps ea Foam 6 laps ea                                Ther Ex    BAPS  L2 x20 ea nv L2 x20 ea L2 2 5# x30 ea np L2 2 5# x30 ea      TB ankle all HEP    purp x30 ea purp  x30ea purp x30ea      Prone ankle PF c weight   nv 2# 5"x10 np np np                                Ther Activity    bike  5 min 8 min 8 min 10 min L3, 10 min L4, 10 min                   Gait Training                              Modalities    CP PRN

## 2021-06-01 ENCOUNTER — OFFICE VISIT (OUTPATIENT)
Dept: PHYSICAL THERAPY | Facility: REHABILITATION | Age: 36
End: 2021-06-01
Payer: OTHER MISCELLANEOUS

## 2021-06-01 DIAGNOSIS — S92.315D CLOSED NONDISPLACED FRACTURE OF FIRST METATARSAL BONE OF LEFT FOOT WITH ROUTINE HEALING, SUBSEQUENT ENCOUNTER: Primary | ICD-10-CM

## 2021-06-01 PROCEDURE — 97140 MANUAL THERAPY 1/> REGIONS: CPT

## 2021-06-01 PROCEDURE — 97530 THERAPEUTIC ACTIVITIES: CPT

## 2021-06-01 PROCEDURE — 97112 NEUROMUSCULAR REEDUCATION: CPT

## 2021-06-01 PROCEDURE — 97110 THERAPEUTIC EXERCISES: CPT

## 2021-06-01 NOTE — PROGRESS NOTES
Daily Note     Today's date: 2021  Patient name: Colon Breath  : 1985  MRN: [id number]  Referring provider: Dat Lam MD  Dx:   Encounter Diagnosis     ICD-10-CM    1  Closed nondisplaced fracture of first metatarsal bone of left foot with routine healing, subsequent encounter  [id number]                   Subjective: Pt reports that he is doing well today with no complaints of pain  Notes that over the weekend he has some discomfort every now and then over the outside of his 1st metatarsal        Objective: See treatment diary below      Assessment: Tolerated treatment well  Ankle mobility is doing well but is challenged with the ankle 4 way, mostly in inv and erv  He did require fingertip A at times with eyes closed balance and the biodex  Patient demonstrated fatigue post treatment, exhibited good technique with therapeutic exercises and would benefit from continued PT  Plan: Continue per plan of care  Progress treatment as tolerated         Precautions: Weight-bearing protocol     Daily Treatment Diary     Date      FOTO IE     perf        Re-eval IE              Manuals      PROM ankle  TE MARCIO TE MARCIO JG JG MM                                            Neuro Re-Ed     rockerboard   x20 ea PF/DF  M/L x30 ea PF/DF  M/L x30 ea PF/DF  M/L x30 ea PF/DF  M/L x30 ea  PF/DF  M/L x30 ea   PF/DF  M/L  x30 ea   PF/DF  M/L     Biodex-LOS  1x static 1x L12 1x static 1x L12 Lv 12  x1 ea Lv12 x2  UE sup Lv 10 x2 UE sup Lv8 x2 UE sup Lv8 x2 UE sup     Biodex-WS  AP & ML  PF/DF x2 L12  M/L L 12 x2 Static x1 ea, Lv 12 x1 ea Lv 12  x2  Lv 12  x2 ea Lv 10 x2 ea Lv 10 x2, ML  Lv9 x2, AP Lv 10 x2, ML  Lv9 x2, AP     Biodex - random     Static med  1' x2 Static med   1' x2 Static med   1' x2 Static med   1' x2     Tandem balance  EO x30" ea EC 2x30" ea EO 1x30" ea EC 2x30" ea EO 1x30" ea EC 2x30" ea EO 1x30" ea EC 2x30" ea Foam EO 1x 30" ea  EC 2x30" ea   Foam   EO 1x 30" ea  EC 2x30" ea  Foam   EO 1x 30" ea  EC 2x30" ea      Tandem & side stepping     Foam 5 laps ea Foam 5 laps ea Foam 6 laps ea Foam 6 laps ea                               Ther Ex    BAPS  L2 x20 ea nv L2 x20 ea L2 2 5# x30 ea np L2 2 5# x30 ea L2 2 5# x30 ea     TB ankle all HEP    purp x30 ea purp  x30ea purp x30ea purp x30     Prone ankle PF c weight   nv 2# 5"x10 np np np np                               Ther Activity    bike  5 min 8 min 8 min 10 min L3, 10 min L4, 10 min L4, 10 min                  Gait Training                              Modalities    CP PRN

## 2021-06-03 ENCOUNTER — OFFICE VISIT (OUTPATIENT)
Dept: PHYSICAL THERAPY | Facility: REHABILITATION | Age: 36
End: 2021-06-03
Payer: OTHER MISCELLANEOUS

## 2021-06-03 DIAGNOSIS — S92.315D CLOSED NONDISPLACED FRACTURE OF FIRST METATARSAL BONE OF LEFT FOOT WITH ROUTINE HEALING, SUBSEQUENT ENCOUNTER: Primary | ICD-10-CM

## 2021-06-03 PROCEDURE — 97140 MANUAL THERAPY 1/> REGIONS: CPT

## 2021-06-03 PROCEDURE — 97112 NEUROMUSCULAR REEDUCATION: CPT

## 2021-06-03 PROCEDURE — 97110 THERAPEUTIC EXERCISES: CPT

## 2021-06-03 NOTE — PROGRESS NOTES
Daily Note     Today's date: 6/3/2021  Patient name: Caridad Bocanegra  : 1985  MRN: [id number]  Referring provider: Sasha Calle MD  Dx:   Encounter Diagnosis     ICD-10-CM    1  Closed nondisplaced fracture of first metatarsal bone of left foot with routine healing, subsequent encounter  I87 [id number]                   Subjective:  Patient reports that he feels his normal morning stiffness in the area of the fracture site  Patient reports that he can go back to work with restrictions (sitting mostly)  Objective: See treatment diary below      Assessment: Tolerated treatment well  Patient exhibited good technique with therapeutic exercises and would benefit from continued PT to attain set goals  Patient noted decreased stiffness post treatment and demonstrated improved gait mechanics post treatment  Patient continues to limit toe off during ambulation secondary to pain at the fracture site  Plan: Continue per plan of care        Precautions: Weight-bearing protocol     Daily Treatment Diary     Date 5/6 5/11 5/14 5/18 5/20 5/24 5/27 6/1 6/3    FOTO IE     perf        Re-eval IE              Manuals 5/6 5/11 5/14 5/18 5/20 5/24 5/27 6/1 6/3    PROM ankle  TE MARCIO TE MARCIO JG JG MM CC                                           Neuro Re-Ed     rockerboard   x20 ea PF/DF  M/L x30 ea PF/DF  M/L x30 ea PF/DF  M/L x30 ea PF/DF  M/L x30 ea  PF/DF  M/L x30 ea   PF/DF  M/L  x30 ea   PF/DF  M/L x30ea PF/DF  M/L    Biodex-LOS  1x static 1x L12 1x static 1x L12 Lv 12  x1 ea Lv12 x2  UE sup Lv 10 x2 UE sup Lv8 x2 UE sup Lv8 x2 UE sup L8 x2 WALT supp    Biodex-WS  AP & ML  PF/DF x2 L12  M/L L 12 x2 Static x1 ea, Lv 12 x1 ea Lv 12  x2  Lv 12  x2 ea Lv 10 x2 ea Lv 10 x2, ML  Lv9 x2, AP Lv 10 x2, ML  Lv9 x2, AP L10 x2 ML  L9x2 AP    Biodex - random     Static med  1' x2 Static med   1' x2 Static med   1' x2 Static med   1' x2 Static med  1'x2    Tandem balance  EO x30" ea EC 2x30" ea EO 1x30" ea EC 2x30" ea EO 1x30" ea EC 2x30" ea EO 1x30" ea EC 2x30" ea Foam EO 1x 30" ea  EC 2x30" ea   Foam   EO 1x 30" ea  EC 2x30" ea  Foam   EO 1x 30" ea  EC 2x30" ea  Foam  EO 1x30"   EC 2x30" ea    Tandem & side stepping     Foam 5 laps ea Foam 5 laps ea Foam 6 laps ea Foam 6 laps ea Foam  6 laps ea                              Ther Ex    BAPS  L2 x20 ea nv L2 x20 ea L2 2 5# x30 ea np L2 2 5# x30 ea L2 2 5# x30 ea L2  2 5# x30 ea    TB ankle all HEP    purp x30 ea purp  x30ea purp x30ea purp x30 purp x30    Prone ankle PF c weight   nv 2# 5"x10 np np np np np                              Ther Activity    bike  5 min 8 min 8 min 10 min L3, 10 min L4, 10 min L4, 10 min L4x10 min                 Gait Training                              Modalities    CP PRN

## 2021-06-08 ENCOUNTER — OFFICE VISIT (OUTPATIENT)
Dept: PHYSICAL THERAPY | Facility: REHABILITATION | Age: 36
End: 2021-06-08
Payer: OTHER MISCELLANEOUS

## 2021-06-08 DIAGNOSIS — S92.315D CLOSED NONDISPLACED FRACTURE OF FIRST METATARSAL BONE OF LEFT FOOT WITH ROUTINE HEALING, SUBSEQUENT ENCOUNTER: Primary | ICD-10-CM

## 2021-06-08 PROCEDURE — 97140 MANUAL THERAPY 1/> REGIONS: CPT

## 2021-06-08 PROCEDURE — 97110 THERAPEUTIC EXERCISES: CPT

## 2021-06-08 PROCEDURE — 97112 NEUROMUSCULAR REEDUCATION: CPT

## 2021-06-08 NOTE — PROGRESS NOTES
Daily Note     Today's date: 2021  Patient name: Kitty Eric  : 1985  MRN: [id number]  Referring provider: Daved Cockayne, MD  Dx:   Encounter Diagnosis     ICD-10-CM    1  Closed nondisplaced fracture of first metatarsal bone of left foot with routine healing, subsequent encounter  [id number]                    Subjective:  Patient reports that his foot feels good in the morning but with walker ( up to 2 miles) his foot starts to ache and he needs to rest it  Patient notes that his achiness does decrease with resting  Objective: See treatment diary below      Assessment: Tolerated treatment well  Patient continues to note progress with his walking distance tolerance  Patient is challenged with balance activity EC but demonstrates improving ROM and strength  Patient exhibited good technique with therapeutic exercises and would benefit from continued PT to attain set goals  Plan: Continue per plan of care        Precautions: Weight-bearing protocol     Daily Treatment Diary     Date  5/11 5/14 5/18 5/20 5/24 5/27 6/1 6/3 6/8   FOTO      perf     perf   Re-eval               Manuals  5/11 5/14 5/18 5/20 5/24 5/27 6/1 6/3 6/8   PROM ankle  TE MARCIO TE MARCIO JG JG MM CC CC                                          Neuro Re-Ed     rockerboard   x20 ea PF/DF  M/L x30 ea PF/DF  M/L x30 ea PF/DF  M/L x30 ea PF/DF  M/L x30 ea  PF/DF  M/L x30 ea   PF/DF  M/L  x30 ea   PF/DF  M/L x30ea PF/DF  M/L x30ea PF/DF  M/L   Biodex-LOS  1x static 1x L12 1x static 1x L12 Lv 12  x1 ea Lv12 x2  UE sup Lv 10 x2 UE sup Lv8 x2 UE sup Lv8 x2 UE sup L8 x2 WALT supp L8 x2 WALT supp   Biodex-WS  AP & ML  PF/DF x2 L12  M/L L 12 x2 Static x1 ea, Lv 12 x1 ea Lv 12  x2  Lv 12  x2 ea Lv 10 x2 ea Lv 10 x2, ML  Lv9 x2, AP Lv 10 x2, ML  Lv9 x2, AP L10 x2 ML  L9x2 AP L10 x2 M/L  L9 A/Px2   Biodex - random     Static med  1' x2 Static med   1' x2 Static med   1' x2 Static med   1' x2 Static med  1'x2 Static  Med 1'x2   Tandem balance EO x30" ea EC 2x30" ea EO 1x30" ea EC 2x30" ea EO 1x30" ea EC 2x30" ea EO 1x30" ea EC 2x30" ea Foam EO 1x 30" ea  EC 2x30" ea   Foam   EO 1x 30" ea  EC 2x30" ea  Foam   EO 1x 30" ea  EC 2x30" ea  Foam  EO 1x30"   EC 2x30" ea Foam EO 1x30" EC2x30" ea   Tandem & side stepping     Foam 5 laps ea Foam 5 laps ea Foam 6 laps ea Foam 6 laps ea Foam  6 laps ea Foam 6laps ea                             Ther Ex    BAPS  L2 x20 ea nv L2 x20 ea L2 2 5# x30 ea np L2 2 5# x30 ea L2 2 5# x30 ea L2  2 5# x30 ea L2 2 5#   TB ankle all     purp x30 ea purp  x30ea purp x30ea purp x30 purp x30 purp x30 ea   Prone ankle PF c weight   nv 2# 5"x10 np np np np np                              Ther Activity    bike  5 min 8 min 8 min 10 min L3, 10 min L4, 10 min L4, 10 min L4x10 min L4 x10'                Gait Training                              Modalities    CP PRN

## 2021-06-10 ENCOUNTER — EVALUATION (OUTPATIENT)
Dept: PHYSICAL THERAPY | Facility: REHABILITATION | Age: 36
End: 2021-06-10
Payer: OTHER MISCELLANEOUS

## 2021-06-10 DIAGNOSIS — S92.315D CLOSED NONDISPLACED FRACTURE OF FIRST METATARSAL BONE OF LEFT FOOT WITH ROUTINE HEALING, SUBSEQUENT ENCOUNTER: Primary | ICD-10-CM

## 2021-06-10 PROCEDURE — 97112 NEUROMUSCULAR REEDUCATION: CPT | Performed by: PHYSICAL THERAPIST

## 2021-06-10 PROCEDURE — 97530 THERAPEUTIC ACTIVITIES: CPT | Performed by: PHYSICAL THERAPIST

## 2021-06-10 PROCEDURE — 97110 THERAPEUTIC EXERCISES: CPT | Performed by: PHYSICAL THERAPIST

## 2021-06-10 NOTE — PROGRESS NOTES
PT Evaluation     Today's date: 6/10/2021  Patient name: José Miguel Cabrales  : 1985  MRN: [id number]  Referring provider: Patito Bowen MD  Dx:   Encounter Diagnosis     ICD-10-CM    1  Closed nondisplaced fracture of first metatarsal bone of left foot with routine healing, subsequent encounter  S92 315D        Start Time: 4800  Stop Time: 7873  Total time in clinic (min): 60 minutes      Subjective Evaluation    History of Present Illness  Mechanism of injury: 21  Pt comes to therapy reporting injury to L foot while at work  States he was examined at urgent care the following day, where fractures were revealed  Followed up with orthopedic physician  Recently cleared and advised to ambulate in CAM boot as tolerated without crutches  Reports mild pain along dorsum of foot  Denies paresthesias  06/10/21  Pt states that he is able to walk up to 2 miles throughout the day and he tries to walk more each day but is only walking a quarter of what he used to be able to (10 miles)  He still notices some discomfort/ache with repetitive PF/DF around the distal first metatarsal  States it feels the best in the morning but by the afternoon feels he needs to take a rest, and can notice he begins to invert his L foot as the day goes on due to fatigue  Believes he has made a lot of progress since his injury but feels he could get stronger and improve his balance     Pain  Current pain ratin  At worst pain ratin (discomfort>pain)    Patient Goals  Patient goals for therapy: decreased pain, increased motion, independence with ADLs/IADLs, increased strength, improved balance and return to work          Objective     Active Range of Motion     Right Ankle/Foot   Dorsiflexion (ke): 20 degrees   Plantar flexion: 50 degrees   Inversion: 45 degrees   Eversion: -5 degrees     Passive Range of Motion     Right Ankle/Foot    Dorsiflexion (ke): 34 degrees   Plantar flexion: 50 degrees   Inversion: 50 degrees   Eversion: 20 degrees     Strength/Myotome Testing     Right Ankle/Foot   Dorsiflexion: 5  Plantar flexion: 5  Inversion: 4+  Eversion: 4+    Tests     Additional Tests Details  05/06/21  Gait - CAM boot full weight             Precautions: Weight-bearing protocol     Daily Treatment Diary     Date 6/10 5/11 5/14 5/18 5/20 5/24 5/27 6/1 6/3 6/8   FOTO      perf     perf   Re-eval               Manuals 6/10 5/11 5/14 5/18 5/20 5/24 5/27 6/1 6/3 6/8   PROM ankle np TE MARCIO TE MARCIO JG JG MM CC CC                                          Neuro Re-Ed     rockerboard  x30ea   PF/DF M/L x20 ea PF/DF  M/L x30 ea PF/DF  M/L x30 ea PF/DF  M/L x30 ea PF/DF  M/L x30 ea  PF/DF  M/L x30 ea   PF/DF  M/L  x30 ea   PF/DF  M/L x30ea PF/DF  M/L x30ea PF/DF  M/L   Biodex-LOS L7 x2   WALT supp 1x static 1x L12 1x static 1x L12 Lv 12  x1 ea Lv12 x2  UE sup Lv 10 x2 UE sup Lv8 x2 UE sup Lv8 x2 UE sup L8 x2 WALT supp L8 x2 WALT supp   Biodex-WS  AP & ML L8 x2 ea  PF/DF x2 L12  M/L L 12 x2 Static x1 ea, Lv 12 x1 ea Lv 12  x2  Lv 12  x2 ea Lv 10 x2 ea Lv 10 x2, ML  Lv9 x2, AP Lv 10 x2, ML  Lv9 x2, AP L10 x2 ML  L9x2 AP L10 x2 M/L  L9 A/Px2   Biodex - random Static  Med   1'x2     Static med  1' x2 Static med   1' x2 Static med   1' x2 Static med   1' x2 Static med  1'x2 Static  Med 1'x2   Tandem balance Foam EO 1x30" EC 2x 30" ea EO x30" ea EC 2x30" ea EO 1x30" ea EC 2x30" ea EO 1x30" ea EC 2x30" ea EO 1x30" ea EC 2x30" ea Foam EO 1x 30" ea  EC 2x30" ea   Foam   EO 1x 30" ea  EC 2x30" ea  Foam   EO 1x 30" ea  EC 2x30" ea  Foam  EO 1x30"   EC 2x30" ea Foam EO 1x30" EC2x30" ea   Tandem & side stepping Foam 6 laps ea    Foam 5 laps ea Foam 5 laps ea Foam 6 laps ea Foam 6 laps ea Foam  6 laps ea Foam 6laps ea                             Ther Ex    BAPS L3 2 5# x30 ea  L2 x20 ea nv L2 x20 ea L2 2 5# x30 ea np L2 2 5# x30 ea L2 2 5# x30 ea L2  2 5# x30 ea L2 2 5#   TB ankle all purp x30 ea    purp x30 ea purp  x30ea purp x30ea purp x30 purp x30 purp x30 ea   Prone ankle PF c weight   nv 2# 5"x10 np np np np np                              Ther Activity    bike L4 x10' 5 min 8 min 8 min 10 min L3, 10 min L4, 10 min L4, 10 min L4x10 min L4 x10'                Gait Training                              Modalities    CP PRN

## 2021-06-10 NOTE — PROGRESS NOTES
PT Evaluation     Today's date: 6/10/2021  Patient name: Kiran Hamilton  : 1985  MRN: [id number]  Referring provider: Madhu Cuello MD  Dx:   Encounter Diagnosis     ICD-10-CM    1  Closed nondisplaced fracture of first metatarsal bone of left foot with routine healing, subsequent encounter  S92 315D        Start Time: 7697  Stop Time: 0896  Total time in clinic (min): 60 minutes    Assessment  Assessment details: Pt tolerated treatment well and demonstrated fatigue post treatment  Pt shows improvements in L ankle ROM and strength globally, and improved weight bearing tolerance, and decreased pain  Pt still demonstrates difficulties with static and dynamic balance as well as decreased endurance of musculature of ankle, and decreased tolerance to activity  Pt would continue to benefit from skilled PT to work on these deficits  Impairments: abnormal or restricted ROM, activity intolerance, impaired balance and impaired physical strength  Understanding of Dx/Px/POC: good   Prognosis: good    Goals  ST  Patient will be able to ambulate community distances without limitation or gait abnormalities in 4 weeks  MET  2  Patient will demonstrate 25% improvement in ROM in 4 weeks  MET  3  Patient will demonstrate 1/2 grade improvement in strength in 4 weeks  MET    LT  Patient will be able to perform IADLS without restriction or pain by discharge  MET   2  Patient will be independent in HEP by discharge  MET  3  Patient will be able to return to recreational/work duties without restriction or pain by discharge   [place]       Plan  Patient would benefit from: PT eval and skilled PT  Planned modality interventions: cryotherapy and thermotherapy: hydrocollator packs  Planned therapy interventions: body mechanics training, flexibility, functional ROM exercises, home exercise program, neuromuscular re-education, manual therapy, postural training, strengthening, stretching, therapeutic activities, therapeutic exercise, balance/weight bearing training and patient education  Frequency: 2x week  Duration in visits: 8  Duration in weeks: 4  Treatment plan discussed with: patient        Subjective Evaluation    History of Present Illness  Mechanism of injury: 21  Pt comes to therapy reporting injury to L foot while at work  States he was examined at urgent care the following day, where fractures were revealed  Followed up with orthopedic physician  Recently cleared and advised to ambulate in CAM boot as tolerated without crutches  Reports mild pain along dorsum of foot  Denies paresthesias  06/10/21  Pt states that he is able to walk up to 2 miles throughout the day and he tries to walk more each day but is only walking a quarter of what he used to be able to (10 miles)  He still notices some discomfort/ache with repetitive PF/DF around the distal first metatarsal  States it feels the best in the morning but by the afternoon feels he needs to take a rest, and can notice he begins to invert his L foot as the day goes on due to fatigue  Believes he has made a lot of progress since his injury but feels he could get stronger and improve his balance  States he is uncertain on return to work but will be attending trainings over the course of next week    Pain  Current pain ratin  At best pain ratin  At worst pain ratin    Patient Goals  Patient goals for therapy: increased strength, improved balance and return to work          Objective     Active Range of Motion     Right Ankle/Foot   Dorsiflexion (ke): 20 degrees   Plantar flexion: 50 degrees   Inversion: 45 degrees   Eversion: -5 degrees     Passive Range of Motion     Right Ankle/Foot    Dorsiflexion (ke): 34 degrees   Plantar flexion: 50 degrees   Inversion: 50 degrees   Eversion: 20 degrees     Strength/Myotome Testing     Right Ankle/Foot   Dorsiflexion: 5  Plantar flexion: 5  Inversion: 4+  Eversion: 4+    Tests     Additional Tests Details  05/06/21  Gait - CAM boot full weight    06/10/21  Gait- full, street sneaker              Precautions: Weight-bearing protocol     Daily Treatment Diary     Date  5/11 5/14 5/18 5/20 5/24 5/27 6/1 6/3 6/8   FOTO      perf     perf   Re-eval               Manuals  5/11 5/14 5/18 5/20 5/24 5/27 6/1 6/3 6/8   PROM ankle  TE MARCIO TE MARCIO JG JG MM CC CC                                          Neuro Re-Ed     rockerboard   x20 ea PF/DF  M/L x30 ea PF/DF  M/L x30 ea PF/DF  M/L x30 ea PF/DF  M/L x30 ea  PF/DF  M/L x30 ea   PF/DF  M/L  x30 ea   PF/DF  M/L x30ea PF/DF  M/L x30ea PF/DF  M/L   Biodex-LOS  1x static 1x L12 1x static 1x L12 Lv 12  x1 ea Lv12 x2  UE sup Lv 10 x2 UE sup Lv8 x2 UE sup Lv8 x2 UE sup L8 x2 WALT supp L8 x2 WALT supp   Biodex-WS  AP & ML  PF/DF x2 L12  M/L L 12 x2 Static x1 ea, Lv 12 x1 ea Lv 12  x2  Lv 12  x2 ea Lv 10 x2 ea Lv 10 x2, ML  Lv9 x2, AP Lv 10 x2, ML  Lv9 x2, AP L10 x2 ML  L9x2 AP L10 x2 M/L  L9 A/Px2   Biodex - random     Static med  1' x2 Static med   1' x2 Static med   1' x2 Static med   1' x2 Static med  1'x2 Static  Med 1'x2   Tandem balance  EO x30" ea EC 2x30" ea EO 1x30" ea EC 2x30" ea EO 1x30" ea EC 2x30" ea EO 1x30" ea EC 2x30" ea Foam EO 1x 30" ea  EC 2x30" ea   Foam   EO 1x 30" ea  EC 2x30" ea  Foam   EO 1x 30" ea  EC 2x30" ea  Foam  EO 1x30"   EC 2x30" ea Foam EO 1x30" EC2x30" ea   Tandem & side stepping     Foam 5 laps ea Foam 5 laps ea Foam 6 laps ea Foam 6 laps ea Foam  6 laps ea Foam 6laps ea                             Ther Ex    BAPS  L2 x20 ea nv L2 x20 ea L2 2 5# x30 ea np L2 2 5# x30 ea L2 2 5# x30 ea L2  2 5# x30 ea L2 2 5#   TB ankle all     purp x30 ea purp  x30ea purp x30ea purp x30 purp x30 purp x30 ea   Prone ankle PF c weight   nv 2# 5"x10 np np np np np                              Ther Activity    bike  5 min 8 min 8 min 10 min L3, 10 min L4, 10 min L4, 10 min L4x10 min L4 x10'                Gait Training                              Modalities    CP PRN

## 2021-06-14 ENCOUNTER — OFFICE VISIT (OUTPATIENT)
Dept: PHYSICAL THERAPY | Facility: REHABILITATION | Age: 36
End: 2021-06-14
Payer: OTHER MISCELLANEOUS

## 2021-06-14 DIAGNOSIS — S92.325A CLOSED NONDISPLACED FRACTURE OF SECOND METATARSAL BONE OF LEFT FOOT, INITIAL ENCOUNTER: ICD-10-CM

## 2021-06-14 DIAGNOSIS — S92.315D CLOSED NONDISPLACED FRACTURE OF FIRST METATARSAL BONE OF LEFT FOOT WITH ROUTINE HEALING, SUBSEQUENT ENCOUNTER: Primary | ICD-10-CM

## 2021-06-14 PROCEDURE — 97140 MANUAL THERAPY 1/> REGIONS: CPT

## 2021-06-14 PROCEDURE — 97110 THERAPEUTIC EXERCISES: CPT

## 2021-06-14 PROCEDURE — 97112 NEUROMUSCULAR REEDUCATION: CPT

## 2021-06-14 NOTE — PROGRESS NOTES
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  Patient notes that he was unable to prop it so it really started to bother him  Patient notes some soreness today which he feels maybe weather related  Patient has a Dr appt tomorrow and he will talk to the Dr regarding his work tolerance/symptoms  Objective: See treatment diary below      Assessment: Tolerated treatment well  Patient performed ex without reports of increased soreness  Patient demonstrated fatigue post treatment, exhibited good technique with therapeutic exercises and would benefit from continued PT to attain set goals  Plan: Continue per plan of care        Precautions: Weight-bearing protocol     Daily Treatment Diary     Date 6/14  5/14 5/18 5/20 5/24 5/27 6/1 6/3 6/8   FOTO      perf     perf   Re-eval               Manuals 6/14  5/14 5/18 5/20 5/24 5/27 6/1 6/3 6/8   PROM ankle CC  MARCIO TE MARCIO JG JG MM CC CC                                          Neuro Re-Ed     rockerboard  x30 PF/DF  M/L  x30 ea PF/DF  M/L x30 ea PF/DF  M/L x30 ea PF/DF  M/L x30 ea  PF/DF  M/L x30 ea   PF/DF  M/L  x30 ea   PF/DF  M/L x30ea PF/DF  M/L x30ea PF/DF  M/L   Biodex-LOS L8 x2 WALT supp  1x static 1x L12 Lv 12  x1 ea Lv12 x2  UE sup Lv 10 x2 UE sup Lv8 x2 UE sup Lv8 x2 UE sup L8 x2 WALT supp L8 x2 WALT supp   Biodex-WS  AP & ML L9 x1 ea  L8 x1 ea M/L,A/P  Static x1 ea, Lv 12 x1 ea Lv 12  x2  Lv 12  x2 ea Lv 10 x2 ea Lv 10 x2, ML  Lv9 x2, AP Lv 10 x2, ML  Lv9 x2, AP L10 x2 ML  L9x2 AP L10 x2 M/L  L9 A/Px2   Biodex - random L11 Med 1'x2    Static med  1' x2 Static med   1' x2 Static med   1' x2 Static med   1' x2 Static med  1'x2 Static  Med 1'x2   Tandem balance Foam EO 1x30" EC 2x30"  EO 1x30" ea EC 2x30" ea EO 1x30" ea EC 2x30" ea EO 1x30" ea EC 2x30" ea Foam EO 1x 30" ea  EC 2x30" ea   Foam   EO 1x 30" ea  EC 2x30" ea  Foam   EO 1x 30" ea  EC 2x30" ea  Foam  EO 1x30"   EC 2x30" ea Foam EO 1x30" EC2x30" ea   Tandem & side stepping Foam 6laps ea    Foam 5 laps ea Foam 5 laps ea Foam 6 laps ea Foam 6 laps ea Foam  6 laps ea Foam 6laps ea                             Ther Ex    BAPS L3 2 5# x30 ea  nv L2 x20 ea L2 2 5# x30 ea np L2 2 5# x30 ea L2 2 5# x30 ea L2  2 5# x30 ea L2 2 5# x30 ea   TB ankle all purpx 30 ea    purp x30 ea purp  x30ea purp x30ea purp x30 purp x30 purp x30 ea   Prone ankle PF c weight   nv 2# 5"x10 np np np np np                              Ther Activity    bike L4 x10'  8 min 8 min 10 min L3, 10 min L4, 10 min L4, 10 min L4x10 min L4 x10'                Gait Training                              Modalities    CP PRN

## 2021-06-15 ENCOUNTER — OFFICE VISIT (OUTPATIENT)
Dept: OBGYN CLINIC | Facility: CLINIC | Age: 36
End: 2021-06-15
Payer: OTHER MISCELLANEOUS

## 2021-06-15 ENCOUNTER — APPOINTMENT (OUTPATIENT)
Dept: RADIOLOGY | Facility: AMBULARY SURGERY CENTER | Age: 36
End: 2021-06-15
Attending: ORTHOPAEDIC SURGERY
Payer: OTHER MISCELLANEOUS

## 2021-06-15 VITALS
HEIGHT: 68 IN | HEART RATE: 65 BPM | BODY MASS INDEX: 23.19 KG/M2 | WEIGHT: 153 LBS | RESPIRATION RATE: 18 BRPM | DIASTOLIC BLOOD PRESSURE: 85 MMHG | SYSTOLIC BLOOD PRESSURE: 123 MMHG

## 2021-06-15 DIAGNOSIS — S92.325A CLOSED NONDISPLACED FRACTURE OF SECOND METATARSAL BONE OF LEFT FOOT, INITIAL ENCOUNTER: ICD-10-CM

## 2021-06-15 DIAGNOSIS — S92.315D CLOSED NONDISPLACED FRACTURE OF FIRST METATARSAL BONE OF LEFT FOOT WITH ROUTINE HEALING, SUBSEQUENT ENCOUNTER: ICD-10-CM

## 2021-06-15 DIAGNOSIS — S97.82XD CRUSHING INJURY OF LEFT FOOT, SUBSEQUENT ENCOUNTER: ICD-10-CM

## 2021-06-15 DIAGNOSIS — S97.82XD CRUSHING INJURY OF LEFT FOOT, SUBSEQUENT ENCOUNTER: Primary | ICD-10-CM

## 2021-06-15 PROCEDURE — 99024 POSTOP FOLLOW-UP VISIT: CPT | Performed by: ORTHOPAEDIC SURGERY

## 2021-06-15 PROCEDURE — 73630 X-RAY EXAM OF FOOT: CPT

## 2021-06-15 NOTE — PROGRESS NOTES
RUSS Garcia  Attending, Orthopaedic Surgery  [facility] [address] [facility]      ORTHOPAEDIC FOOT AND ANKLE CLINIC VISIT     Assessment:     Encounter Diagnoses   Name Primary?  Crushing injury of left foot, subsequent encounter Yes    Closed nondisplaced fracture of second metatarsal bone of left foot, initial encounter     Closed nondisplaced fracture of first metatarsal bone of left foot with routine healing, subsequent encounter             Plan:   · The patient verbalized understanding of exam findings and treatment plan  We engaged in the shared decision-making process and treatment options were discussed at length with the patient  Surgical and conservative management discussed today along with risks and benefits  · His xrays continue to show healing  He is perfectly aligned  · His PT is going well and he is making progress  · He is at desk duty at work and appears on schedule to return to work without restrictions 8/1/21  · Work note provided  Return in about 3 months (around 9/15/2021)  Weightbearing Xrays at that time      History of Present Illness:   Chief Complaint:   Chief Complaint   Patient presents with    Left Foot - Pain, Follow-up     Maikel Bernardo is a 38 y o  male who is being seen in follow-up for left foot fractures  When we last saw he we recommended wbat in sneaker  Pain has continued to improve  Residual pain is localized at 1st MTP with minimal radiating and described as sharp and severe        Pain/symptom timing:  Worse during the day when active  Pain/symptom context:  Worse with activites and work  Pain/symptom modifying factors:  Rest makes better, activities make worse  Pain/symptom associated signs/symptoms: none    Prior treatment   · NSAIDsYes   · Injections No   · Bracing/Orthotics Yes    · Physical Therapy No     Orthopedic Surgical History:   See below    Past Medical, Surgical and Social History:  Past Medical History:  has no past medical history on file  Problem List: does not have any pertinent problems on file  Past Surgical History:  has no past surgical history on file  Family History: family history includes Cancer in his maternal grandfather and maternal grandmother; Heart disease in his paternal uncle  Social History:  reports that he has never smoked  He has never used smokeless tobacco  He reports that he does not drink alcohol and does not use drugs  Current Medications: has a current medication list which includes the following prescription(s): aspirin  Allergies: has No Known Allergies  Review of Systems:  General- denies fever/chills  HEENT- denies hearing loss or sore throat  Eyes- denies eye pain or visual disturbances, denies red eyes  Respiratory- denies cough or SOB  Cardio- denies chest pain or palpitations  GI- denies abdominal pain  Endocrine- denies urinary frequency  Urinary- denies pain with urination  Musculoskeletal- Negative except noted above  Skin- denies rashes or wounds  Neurological- denies dizziness or headache  Psychiatric- denies anxiety or difficulty concentrating    Physical Exam:   /85 (BP Location: Right arm, Patient Position: Sitting)   Pulse 65   Resp 18   Ht 5' 8" (1 727 m)   Wt 69 4 kg (153 lb)   BMI 23 26 kg/m²   General/Constitutional: No apparent distress: well-nourished and well developed  Eyes: normal ocular motion  Lymphatic: No appreciable lymphadenopathy  Respiratory: Non-labored breathing  Vascular: No edema, swelling or tenderness, except as noted in detailed exam   Integumentary: No impressive skin lesions present, except as noted in detailed exam   Neuro: No ataxia or tremors noted  Psych: Normal mood and affect, oriented to person, place and time  Appropriate affect  Musculoskeletal: Normal, except as noted in detailed exam and in HPI      Examination    Left    Gait Normal   Musculoskeletal Tender to palpation at 1st MTP    Skin Normal       Nails Normal    Range of Motion  20 degrees dorsiflexion, 30 degrees plantarflexion  Subtalar motion: normal    Stability Stable    Muscle Strength 5/5 tibialis anterior  5/5 gastrocnemius-soleus  5/5 posterior tibialis  5/5 peroneal/eversion strength  5/5 EHL  5/5 FHL    Neurologic Normal    Sensation  Intact to light touch throughout sural, saphenous, superficial peroneal, deep peroneal and medial/lateral plantar nerve distributions  [place]-Ell[place] 5 07 filament (10g) testing deferred  Cardiovascular Brisk capillary refill < 2 seconds,intact DP and PT pulses    Special Tests None      Imaging Studies:   3 views of the Left foot were obtained, reviewed and interpreted independently which demonstrate interval healing of 1st Metatarsal fracture  Reviewed by me personally  Scribe Attestation    I,:   am acting as a scribe while in the presence of the attending physician :       I,:   personally performed the services described in this documentation    as scribed in my presence :             Audelia Munroe Lachman, MD  Foot & Ankle Surgery   Department of [place] [address]      I personally performed the service  Audelia Munroe Lachman, MD

## 2021-06-15 NOTE — LETTER
Kristine 15, 2021     Patient: Sandra Kelly   YOB: 1985   Date of Visit: 6/15/2021       To Whom it May Concern:    Maggie Ruffin is under my professional care  He was seen in my office on 6/15/2021  He is cleared to return to work desk duty only for the next 6 weeks  He is cleared to return to work full duty without restriction on 8/1/21  If you have any questions or concerns, please don't hesitate to call  Sincerely,          Marizol Teixeira MD        CC: Shirley Hernandez Anil

## 2021-06-15 NOTE — PATIENT INSTRUCTIONS
Rehabilitation Exercises -  Big Toe Joint Exercises : Aim - To restore big toe joint movements   1  Seated heel raises x 20: Sit with foot flat on floor, knees bent 90? keeping toes on floor, raise heel to the limit of pain and return  Bend your knee more than 90? if you feel comfortable to do so, this will bend the toe more  2  Toe lifts x 20: Sit with foot flat on floor, raise toe as far as possible to ceiling and return   3  Toe bends x 20: Sit with toes resting over the edge of a phone book, bend toes towards the floor   4  Toe pulls x 20: Pull toe up with hand to pain and hold for 3 seconds, relax   5  Toe pushes x 20: Push toe down with hand to pain and hold for 3 seconds, relax     Rehabilitation Exercises -   6  Standing both heel raises x 20: Stand close to a wall for balance, raise heels to the limit of toe pain and hold for 3 seconds   7  Standing single heel raises x 20: Stand close to a wall for balance, stand on one leg, raise heel to the limit of toe pain and hold for 3 seconds   8   Continue exercise 3, 4 and 5   Ankle and Calf Exercises : Aim - To maintain muscle tone, strength and mobility   Ankle and Calf: ankle plantar flexion (tip toe position) and dorsiflexion (bring foot up), strengthening of the peroneal muscles (bring foot out to the side)   Gait training: Optimise load distribution for the whole foot focusing on weight bearing of the first MTP (big toe) joint and hallux (big toe)

## 2021-06-17 ENCOUNTER — OFFICE VISIT (OUTPATIENT)
Dept: PHYSICAL THERAPY | Facility: REHABILITATION | Age: 36
End: 2021-06-17
Payer: OTHER MISCELLANEOUS

## 2021-06-17 DIAGNOSIS — S92.315D CLOSED NONDISPLACED FRACTURE OF FIRST METATARSAL BONE OF LEFT FOOT WITH ROUTINE HEALING, SUBSEQUENT ENCOUNTER: Primary | ICD-10-CM

## 2021-06-17 PROCEDURE — 97112 NEUROMUSCULAR REEDUCATION: CPT | Performed by: PHYSICAL THERAPIST

## 2021-06-17 PROCEDURE — 97530 THERAPEUTIC ACTIVITIES: CPT | Performed by: PHYSICAL THERAPIST

## 2021-06-17 PROCEDURE — 97110 THERAPEUTIC EXERCISES: CPT | Performed by: PHYSICAL THERAPIST

## 2021-06-17 NOTE — PROGRESS NOTES
Daily Note     Today's date: 2021  Patient name: Carlene Mckeon  : 1985  MRN: [id number]  Referring provider: Lisa Cardona MD  Dx:   Encounter Diagnosis     ICD-10-CM    1  Closed nondisplaced fracture of first metatarsal bone of left foot with routine healing, subsequent encounter  [id number]        Start Time: 945  Stop Time: 1030  Total time in clinic (min): 45 minutes    Subjective: Pt states he saw the doctor this week and states he is happy with his progress and will clear him for more walking activities at work on   He then said he will repeat xrays in September to be fulling cleared for work duties  Objective: See treatment diary below      Assessment: Tolerated treatment well  Patient demonstrated fatigue post treatment, exhibited good technique with therapeutic exercises and would benefit from continued PT  Pt tolerated new exercises well, noting increased discomfort with heel raises at the first MTP as well as supinating during the exercise that required cueing to correct  Assessed great toe motion with noted restriction and discomfort with flexion and extension of the MTP and IP joint  Plan: Continue per plan of care  Progress treatment as tolerated         Precautions: Weight-bearing protocol     Daily Treatment Diary     Date 6/14 6/17 5/14 5/18 5/20 5/24 5/27 6/1 6/3 6/8   FOTO      perf     perf   Re-eval               Manuals 6/14 6/14 5/14 5/18 5/20 5/24 5/27 6/1 6/3 6/8   PROM ankle CC JG MARCIO TE MARCIO JG JG MM CC CC   Great toe ROM  JG                                     Neuro Re-Ed     rockerboard  x30 PF/DF  M/L HR/TR  x30 x30 ea PF/DF  M/L x30 ea PF/DF  M/L x30 ea PF/DF  M/L x30 ea  PF/DF  M/L x30 ea   PF/DF  M/L  x30 ea   PF/DF  M/L x30ea PF/DF  M/L x30ea PF/DF  M/L   Biodex-LOS L8 x2 WALT supp L8 x2   WALT   supp 1x static 1x L12 Lv 12  x1 ea Lv12 x2  UE sup Lv 10 x2 UE sup Lv8 x2 UE sup Lv8 x2 UE sup L8 x2 WALT supp L8 x2 WALT supp   Biodex-WS  AP & ML L9 x1 ea  L8 x1 ea M/L,A/P L8 x2 A/P, M/L Static x1 ea, Lv 12 x1 ea Lv 12  x2  Lv 12  x2 ea Lv 10 x2 ea Lv 10 x2, ML  Lv9 x2, AP Lv 10 x2, ML  Lv9 x2, AP L10 x2 ML  L9x2 AP L10 x2 M/L  L9 A/Px2   Biodex - random L11 Med 1'x2 L10 Med 1'x2    Static med  1' x2 Static med   1' x2 Static med   1' x2 Static med   1' x2 Static med  1'x2 Static  Med 1'x2   Tandem balance Foam EO 1x30" EC 2x30" Foam EO 1x30" EC 2x30" EO 1x30" ea EC 2x30" ea EO 1x30" ea EC 2x30" ea EO 1x30" ea EC 2x30" ea Foam EO 1x 30" ea  EC 2x30" ea   Foam   EO 1x 30" ea  EC 2x30" ea  Foam   EO 1x 30" ea  EC 2x30" ea  Foam  EO 1x30"   EC 2x30" ea Foam EO 1x30" EC2x30" ea   Tandem & side stepping Foam 6laps ea Foam rebounder 4 laps ea    Foam 5 laps ea Foam 5 laps ea Foam 6 laps ea Foam 6 laps ea Foam  6 laps ea Foam 6laps ea                             Ther Ex    BAPS L3 2 5# x30 ea np nv L2 x20 ea L2 2 5# x30 ea np L2 2 5# x30 ea L2 2 5# x30 ea L2  2 5# x30 ea L2 2 5# x30 ea   TB ankle all purpx 30 ea np   purp x30 ea purp  x30ea purp x30ea purp x30 purp x30 purp x30 ea   Prone ankle PF c weight   nv 2# 5"x10 np np np np np    Leg press c heel raise   44#   2x10                        Ther Activity    bike L4 x10' L4, 10' 8 min 8 min 10 min L3, 10 min L4, 10 min L4, 10 min L4x10 min L4 x10'                Gait Training                              Modalities    CP PRN

## 2021-06-18 ENCOUNTER — TELEPHONE (OUTPATIENT)
Dept: OBGYN CLINIC | Facility: HOSPITAL | Age: 36
End: 2021-06-18

## 2021-06-18 NOTE — TELEPHONE ENCOUNTER
Dr Dexter Munoz    Electronically faxed 6/15 OVN to Trisha from [employer]       Fax # [phone]

## 2021-06-21 ENCOUNTER — OFFICE VISIT (OUTPATIENT)
Dept: PHYSICAL THERAPY | Facility: REHABILITATION | Age: 36
End: 2021-06-21
Payer: OTHER MISCELLANEOUS

## 2021-06-21 DIAGNOSIS — S92.315D CLOSED NONDISPLACED FRACTURE OF FIRST METATARSAL BONE OF LEFT FOOT WITH ROUTINE HEALING, SUBSEQUENT ENCOUNTER: Primary | ICD-10-CM

## 2021-06-21 PROCEDURE — 97112 NEUROMUSCULAR REEDUCATION: CPT | Performed by: PHYSICAL THERAPIST

## 2021-06-21 PROCEDURE — 97110 THERAPEUTIC EXERCISES: CPT | Performed by: PHYSICAL THERAPIST

## 2021-06-21 PROCEDURE — 97140 MANUAL THERAPY 1/> REGIONS: CPT | Performed by: PHYSICAL THERAPIST

## 2021-06-21 PROCEDURE — 97530 THERAPEUTIC ACTIVITIES: CPT | Performed by: PHYSICAL THERAPIST

## 2021-06-21 NOTE — PROGRESS NOTES
Daily Note     Today's date: 2021  Patient name: Alli Carpenter  : 1985  MRN: [id number]  Referring provider: Brittany Blackman MD  Dx:   Encounter Diagnosis     ICD-10-CM    1  Closed nondisplaced fracture of first metatarsal bone of left foot with routine healing, subsequent encounter  [id number]                   Subjective: Pt reports to therapy with no complaints of discomfort or pain and states he worked all weekend, utilizing his lunch break to stretch his ankle  Continues to report discomfort and difficulty with great toe extension  Objective: See treatment diary below      Assessment: Tolerated treatment well  Patient demonstrated fatigue post treatment, exhibited good technique with therapeutic exercises and would benefit from continued PT  Pt continued to require cueing to load medially during heal raises but noted decreased discomfort over great toe during exercises  Plan: Continue per plan of care  Progress treatment as tolerated         Precautions: Weight-bearing protocol     Daily Treatment Diary     Date 6/14 6/17 6/21 5/18 5/20 5/24 5/27 6/1 6/3 6/8   FOTO      perf     perf   Re-eval               Manuals 6/14 6/14 6/21 5/18 5/20 5/24 5/27 6/1 6/3 6/8   PROM ankle CC JG  TE MARCIO JG JG MM CC CC   Great toe ROM  JG                                     Neuro Re-Ed     rockerboard  x30 PF/DF  M/L HR/TR  x30 HR/TR x30  x30 ea PF/DF  M/L x30 ea PF/DF  M/L x30 ea  PF/DF  M/L x30 ea   PF/DF  M/L  x30 ea   PF/DF  M/L x30ea PF/DF  M/L x30ea PF/DF  M/L   Biodex-LOS L8 x2 WALT supp L8 x2   WALT   supp L8 x2 WALT supp Lv 12  x1 ea Lv12 x2  UE sup Lv 10 x2 UE sup Lv8 x2 UE sup Lv8 x2 UE sup L8 x2 WALT supp L8 x2 WALT supp   Biodex-WS  AP & ML L9 x1 ea  L8 x1 ea M/L,A/P L8 x2 A/P, M/L L7 x2   A/P, M/L  Lv 12  x2  Lv 12  x2 ea Lv 10 x2 ea Lv 10 x2, ML  Lv9 x2, AP Lv 10 x2, ML  Lv9 x2, AP L10 x2 ML  L9x2 AP L10 x2 M/L  L9 A/Px2   Biodex - random L11 Med 1'x2 L10 Med 1'x2  L10 Med 1'x2   Static med  1' x2 Static med   1' x2 Static med   1' x2 Static med   1' x2 Static med  1'x2 Static  Med 1'x2   Tandem balance Foam EO 1x30" EC 2x30" Foam EO 1x30" EC 2x30" Foam EO 1x30" EC 2x30" EO 1x30" ea EC 2x30" ea EO 1x30" ea EC 2x30" ea Foam EO 1x 30" ea  EC 2x30" ea   Foam   EO 1x 30" ea  EC 2x30" ea  Foam   EO 1x 30" ea  EC 2x30" ea  Foam  EO 1x30"   EC 2x30" ea Foam EO 1x30" EC2x30" ea   Tandem & side stepping Foam 6laps ea Foam rebounder 4 laps ea  Foam rebouder 4 laps ea   Foam 5 laps ea Foam 5 laps ea Foam 6 laps ea Foam 6 laps ea Foam  6 laps ea Foam 6laps ea                             Ther Ex    BAPS L3 2 5# x30 ea np np L2 x20 ea L2 2 5# x30 ea np L2 2 5# x30 ea L2 2 5# x30 ea L2  2 5# x30 ea L2 2 5# x30 ea   TB ankle all purpx 30 ea np np  purp x30 ea purp  x30ea purp x30ea purp x30 purp x30 purp x30 ea   Prone ankle PF c weight    2# 5"x10 np np np np np    Leg press c heel raise   44#   2x10 np                       Ther Activity    bike L4 x10' L4, 10' elliptical L4, 10'  8 min 10 min L3, 10 min L4, 10 min L4, 10 min L4x10 min L4 x10'                Gait Training                              Modalities    CP PRN

## 2021-06-24 ENCOUNTER — OFFICE VISIT (OUTPATIENT)
Dept: PHYSICAL THERAPY | Facility: REHABILITATION | Age: 36
End: 2021-06-24
Payer: OTHER MISCELLANEOUS

## 2021-06-24 DIAGNOSIS — S92.315D CLOSED NONDISPLACED FRACTURE OF FIRST METATARSAL BONE OF LEFT FOOT WITH ROUTINE HEALING, SUBSEQUENT ENCOUNTER: Primary | ICD-10-CM

## 2021-06-24 PROCEDURE — 97110 THERAPEUTIC EXERCISES: CPT | Performed by: PHYSICAL THERAPIST

## 2021-06-24 PROCEDURE — 97112 NEUROMUSCULAR REEDUCATION: CPT | Performed by: PHYSICAL THERAPIST

## 2021-06-24 PROCEDURE — 97530 THERAPEUTIC ACTIVITIES: CPT | Performed by: PHYSICAL THERAPIST

## 2021-06-24 NOTE — PROGRESS NOTES
Daily Note     Today's date: 2021  Patient name: Jurgen Potts  : 1985  MRN: [id number]  Referring provider: Isma Guerra MD  Dx:   Encounter Diagnosis     ICD-10-CM    1  Closed nondisplaced fracture of first metatarsal bone of left foot with routine healing, subsequent encounter  [id number]                   Subjective: Pt comes to therapy denying pain or discomfort  Denies discomfort following last treatment session  Objective: See treatment diary below      Assessment: Tolerated treatment well  Patient demonstrated fatigue post treatment, exhibited good technique with therapeutic exercises and would benefit from continued PT      Plan: Progress treatment as tolerated         Precautions: Weight-bearing protocol     Daily Treatment Diary     Date 6/14 6/17 6/21 6/24 5/20 5/24 5/27 6/1 6/3 6/8   FOTO      perf     perf   Re-eval               Manuals 6/14 6/14 6/21 6/24 5/20 5/24 5/27 6/1 6/3 6/8   PROM ankle CC JG   MARCIO JG JG MM CC CC   Great toe ROM  JG                                     Neuro Re-Ed     rockerboard  x30 PF/DF  M/L HR/TR  x30 HR/TR x30  Up2, dwn 1  2x10 x30 ea PF/DF  M/L x30 ea  PF/DF  M/L x30 ea   PF/DF  M/L  x30 ea   PF/DF  M/L x30ea PF/DF  M/L x30ea PF/DF  M/L   Biodex-LOS L8 x2 WALT supp L8 x2   WALT   supp L8 x2 WALT supp L7 x2 WALT supp Lv12 x2  UE sup Lv 10 x2 UE sup Lv8 x2 UE sup Lv8 x2 UE sup L8 x2 WALT supp L8 x2 WALT supp   Biodex-WS  AP & ML L9 x1 ea  L8 x1 ea M/L,A/P L8 x2 A/P, M/L L7 x2   A/P, M/L  L5 x2   A/P, M/L  Lv 12  x2 ea Lv 10 x2 ea Lv 10 x2, ML  Lv9 x2, AP Lv 10 x2, ML  Lv9 x2, AP L10 x2 ML  L9x2 AP L10 x2 M/L  L9 A/Px2   Biodex - random L11 Med 1'x2 L10 Med 1'x2  L10 Med 1'x2  L8, L7 med 1'x2 ea Static med  1' x2 Static med   1' x2 Static med   1' x2 Static med   1' x2 Static med  1'x2 Static  Med 1'x2   Tandem balance Foam EO 1x30" EC 2x30" Foam EO 1x30" EC 2x30" Foam EO 1x30" EC 2x30" Foam EO 1x30" EC 2x30" EO 1x30" ea EC 2x30" ea Foam EO 1x 30" ea  EC 2x30" ea Foam   EO 1x 30" ea  EC 2x30" ea  Foam   EO 1x 30" ea  EC 2x30" ea  Foam  EO 1x30"   EC 2x30" ea Foam EO 1x30" EC2x30" ea   Tandem & side stepping Foam 6laps ea Foam rebounder 4 laps ea  Foam rebouder 4 laps ea  Foam rebouder 4 laps ea  Foam 5 laps ea Foam 5 laps ea Foam 6 laps ea Foam 6 laps ea Foam  6 laps ea Foam 6laps ea                             Ther Ex    BAPS L3 2 5# x30 ea np np  L2 2 5# x30 ea np L2 2 5# x30 ea L2 2 5# x30 ea L2  2 5# x30 ea L2 2 5# x30 ea   TB ankle all purpx 30 ea np np  purp x30 ea purp  x30ea purp x30ea purp x30 purp x30 purp x30 ea   Prone ankle PF c weight     np np np np np    Leg press c heel raise   44#   2x10 np 88# 2x10                      Ther Activity    bike L4 x10' L4, 10' elliptical L4, 10'  5 bike, 5 ellip 10 min L3, 10 min L4, 10 min L4, 10 min L4x10 min L4 x10'                Gait Training                              Modalities    CP PRN

## 2021-06-28 ENCOUNTER — OFFICE VISIT (OUTPATIENT)
Dept: PHYSICAL THERAPY | Facility: REHABILITATION | Age: 36
End: 2021-06-28
Payer: OTHER MISCELLANEOUS

## 2021-06-28 DIAGNOSIS — S92.315D CLOSED NONDISPLACED FRACTURE OF FIRST METATARSAL BONE OF LEFT FOOT WITH ROUTINE HEALING, SUBSEQUENT ENCOUNTER: Primary | ICD-10-CM

## 2021-06-28 PROCEDURE — 97110 THERAPEUTIC EXERCISES: CPT | Performed by: PHYSICAL THERAPIST

## 2021-06-28 PROCEDURE — 97530 THERAPEUTIC ACTIVITIES: CPT | Performed by: PHYSICAL THERAPIST

## 2021-06-28 PROCEDURE — 97112 NEUROMUSCULAR REEDUCATION: CPT | Performed by: PHYSICAL THERAPIST

## 2021-06-28 NOTE — PROGRESS NOTES
Daily Note     Today's date: 2021  Patient name: Iman Ochoa  : 1985  MRN: [id number]  Referring provider: Charlene Gomez MD  Dx:   Encounter Diagnosis     ICD-10-CM    1  Closed nondisplaced fracture of first metatarsal bone of left foot with routine healing, subsequent encounter  [id number]                   Subjective: Pt comes to therapy denying pain or discomfort  Denies discomfort following last treatment session  Objective: See treatment diary below      Assessment: Tolerated treatment well  Patient demonstrated fatigue post treatment, exhibited good technique with therapeutic exercises and would benefit from continued PT      Plan: Progress treatment as tolerated         Precautions: Weight-bearing protocol     Daily Treatment Diary     Date 6/14 6/17 6/21 6/24 6/28 5/24 5/27 6/1 6/3 6/8   FOTO     nv     perf   Re-eval               Manuals 6/14 6/14 6/21 6/24 6/28 5/24 5/27 6/1 6/3 6/8   PROM ankle CC JG    JG JG MM CC CC   Great toe ROM  JG                                     Neuro Re-Ed     rockerboard  x30 PF/DF  M/L HR/TR  x30 HR/TR x30  Up2, dwn 1  2x10  x30 ea  PF/DF  M/L x30 ea   PF/DF  M/L  x30 ea   PF/DF  M/L x30ea PF/DF  M/L x30ea PF/DF  M/L   Biodex-LOS L8 x2 WALT supp L8 x2   WALT   supp L8 x2 WALT supp L7 x2 WALT supp L7 x1  L5 x1 WALT  Lv 10 x2 UE sup Lv8 x2 UE sup Lv8 x2 UE sup L8 x2 WALT supp L8 x2 WALT supp   Biodex-WS  AP & ML L9 x1  L8 x1 L8 x2  L7 x2    L5 x2    L5, L4 x1 ea    Lv 10 x2 ea Lv 10 x2, ML  Lv9 x2, AP Lv 10 x2, ML  Lv9 x2, AP L10 x2 ML  L9x2 AP L10 x2 M/L  L9 A/Px2   Biodex - random L11 Med 1'x2 L10 Med 1'x2  L10 Med 1'x2  L8, L7 med 1'x2 ea L8, L6 med 1'x2 ea Static med   1' x2 Static med   1' x2 Static med   1' x2 Static med  1'x2 Static  Med 1'x2   Tandem balance Foam EO 1x30" EC 2x30" Foam EO 1x30" EC 2x30" Foam EO 1x30" EC 2x30" Foam EO 1x30" EC 2x30" Foam  EC 2x30" Foam EO 1x 30" ea  EC 2x30" ea Foam   EO 1x 30" ea  EC 2x30" ea  Foam   EO 1x 30" ea  EC 2x30" ea  Foam  EO 1x30"   EC 2x30" ea Foam EO 1x30" EC2x30" ea   Tandem & side stepping Foam 6laps ea Foam rebounder 4 laps ea  Foam rebouder 4 laps ea  Foam rebouder 4 laps ea  Foam rebouder 4 laps ea  Foam 5 laps ea Foam 6 laps ea Foam 6 laps ea Foam  6 laps ea Foam 6laps ea   KB passes on foam     15# 1x10        Lunge onto BOSU     2x5 ea                                  Ther Ex    BAPS L3 2 5# x30 ea np np   np L2 2 5# x30 ea L2 2 5# x30 ea L2  2 5# x30 ea L2 2 5# x30 ea   TB ankle all purpx 30 ea np np   purp  x30ea purp x30ea purp x30 purp x30 purp x30 ea   Prone ankle PF c weight      np np np np    Leg press c heel raise   44#   2x10 np 88# 2x10 88# 3x10                     Ther Activity    bike L4 x10' L4, 10' elliptical L4, 10'  5 bike, 5 ellip 5 bike, 5 ellip L3, 10 min L4, 10 min L4, 10 min L4x10 min L4 x10'                Gait Training                              Modalities    CP PRN

## 2021-07-01 ENCOUNTER — OFFICE VISIT (OUTPATIENT)
Dept: PHYSICAL THERAPY | Facility: REHABILITATION | Age: 36
End: 2021-07-01
Payer: OTHER MISCELLANEOUS

## 2021-07-01 DIAGNOSIS — S92.315D CLOSED NONDISPLACED FRACTURE OF FIRST METATARSAL BONE OF LEFT FOOT WITH ROUTINE HEALING, SUBSEQUENT ENCOUNTER: Primary | ICD-10-CM

## 2021-07-01 PROCEDURE — 97530 THERAPEUTIC ACTIVITIES: CPT

## 2021-07-01 PROCEDURE — 97112 NEUROMUSCULAR REEDUCATION: CPT

## 2021-07-01 PROCEDURE — 97110 THERAPEUTIC EXERCISES: CPT

## 2021-07-01 NOTE — PROGRESS NOTES
Daily Note     Today's date: 2021  Patient name: Humza Austin  : 1985  MRN: [id number]  Referring provider: Andreas Solomon MD  Dx:   Encounter Diagnosis     ICD-10-CM    1  Closed nondisplaced fracture of first metatarsal bone of left foot with routine healing, subsequent encounter  [id number]                   Subjective: pt currently denied pain pre-tx  Denied soreness/adverse reaction following last visit  Objective: See treatment diary below      Assessment: Tolerated treatment well  Patient demonstrated fatigue post treatment, exhibited good technique with therapeutic exercises and would benefit from continued PT      Plan: Continue per plan of care  Progress treatment as tolerated         Precautions: Weight-bearing protocol     Daily Treatment Diary     Date 6/14 6/17 6/21 6/24 6/28 7/1 5/27 6/1 6/3 6/8   FOTO     nv     perf   Re-eval               Manuals 6/14 6/14 6/21 6/24 6/28 7/1 5/27 6/1 6/3 6/8   PROM ankle CC JG     JG MM CC CC   Great toe ROM  JG                                     Neuro Re-Ed     rockerboard  x30 PF/DF  M/L HR/TR  x30 HR/TR x30  Up2, dwn 1  2x10   x30 ea   PF/DF  M/L  x30 ea   PF/DF  M/L x30ea PF/DF  M/L x30ea PF/DF  M/L   Biodex-LOS L8 x2 WALT supp L8 x2   WALT   supp L8 x2 WALT supp L7 x2 WALT supp L7 x1  L5 x1 WALT  L7 x1  L5 x1 WALT  Lv8 x2 UE sup Lv8 x2 UE sup L8 x2 WALT supp L8 x2 WALT supp   Biodex-WS  AP & ML L9 x1  L8 x1 L8 x2  L7 x2    L5 x2    L5, L4 x1 ea    L5, L4 x1 ea  Lv 10 x2, ML  Lv9 x2, AP Lv 10 x2, ML  Lv9 x2, AP L10 x2 ML  L9x2 AP L10 x2 M/L  L9 A/Px2   Biodex - random L11 Med 1'x2 L10 Med 1'x2  L10 Med 1'x2  L8, L7 med 1'x2 ea L6 med 1'x2 ea L6 med 1'x2 ea Static med   1' x2 Static med   1' x2 Static med  1'x2 Static  Med 1'x2   Tandem balance Foam EO 1x30" EC 2x30" Foam EO 1x30" EC 2x30" Foam EO 1x30" EC 2x30" Foam EO 1x30" EC 2x30" Foam  EC 2x30" Foam  EC 2x30" Foam   EO 1x 30" ea  EC 2x30" ea  Foam   EO 1x 30" ea  EC 2x30" ea  Foam  EO 1x30"   EC 2x30" ea Foam EO 1x30" EC2x30" ea   Tandem & side stepping Foam 6laps ea Foam rebounder 4 laps ea  Foam rebouder 4 laps ea  Foam rebouder 4 laps ea  Foam rebouder 4 laps ea  Foam rebouder 5 laps ea Foam 6 laps ea Foam 6 laps ea Foam  6 laps ea Foam 6laps ea   KB passes on foam     15# 1x10 15# 1x10       Lunge onto BOSU     2x5 ea x10 ea                                 Ther Ex    BAPS L3 2 5# x30 ea np np    L2 2 5# x30 ea L2 2 5# x30 ea L2  2 5# x30 ea L2 2 5# x30 ea   TB ankle all purpx 30 ea np np    purp x30ea purp x30 purp x30 purp x30 ea   Prone ankle PF c weight       np np np    Leg press c heel raise   44#   2x10 np 88# 2x10 88# 3x10 88# 3x10                    Ther Activity    bike L4 x10' L4, 10' elliptical L4, 10'  5 bike, 5 ellip 5 bike, 5 ellip 5 bike, 5 ellip L4, 10 min L4, 10 min L4x10 min L4 x10'                Gait Training                              Modalities    CP PRN

## 2021-07-05 ENCOUNTER — APPOINTMENT (OUTPATIENT)
Dept: PHYSICAL THERAPY | Facility: REHABILITATION | Age: 36
End: 2021-07-05
Payer: OTHER MISCELLANEOUS

## 2021-07-06 ENCOUNTER — OFFICE VISIT (OUTPATIENT)
Dept: PHYSICAL THERAPY | Facility: REHABILITATION | Age: 36
End: 2021-07-06
Payer: OTHER MISCELLANEOUS

## 2021-07-06 DIAGNOSIS — S92.315D CLOSED NONDISPLACED FRACTURE OF FIRST METATARSAL BONE OF LEFT FOOT WITH ROUTINE HEALING, SUBSEQUENT ENCOUNTER: Primary | ICD-10-CM

## 2021-07-06 PROCEDURE — 97110 THERAPEUTIC EXERCISES: CPT | Performed by: PHYSICAL THERAPIST

## 2021-07-06 PROCEDURE — 97112 NEUROMUSCULAR REEDUCATION: CPT | Performed by: PHYSICAL THERAPIST

## 2021-07-06 PROCEDURE — 97530 THERAPEUTIC ACTIVITIES: CPT | Performed by: PHYSICAL THERAPIST

## 2021-07-06 NOTE — PROGRESS NOTES
Daily Note     Today's date: 2021  Patient name: Praneeth Hannah  : 1985  MRN: [id number]  Referring provider: Davian Mercedes MD  Dx:   Encounter Diagnosis     ICD-10-CM    1  Closed nondisplaced fracture of first metatarsal bone of left foot with routine healing, subsequent encounter  [id number]                   Subjective: Pt reports no new complaints of pain/discomfort  States he has been having less discomfort with bringing his toes towards his face, but after a long day at work states he foot feels tired and sore  Objective: See treatment diary below      Assessment: Tolerated treatment well  Patient demonstrated fatigue post treatment, exhibited good technique with therapeutic exercises and would benefit from continued PT  Required cueing for lunges to increase toe extension  Continues to demonstrate decreased dynamic balance  Plan: Continue per plan of care  Progress treatment as tolerated         Precautions: Weight-bearing protocol     Daily Treatment Diary     Date 6/14 6/17 6/21 6/24 6/28 7/1 7/6 6/1 6/3 6/8   FOTO     nv     perf   Re-eval               Manuals 6/14 6/14 6/21 6/24 6/28 7/1 7/6 6/1 6/3 6/8   PROM ankle CC JG      MM CC CC   Great toe ROM  JG                                     Neuro Re-Ed     rockerboard  x30 PF/DF  M/L HR/TR  x30 HR/TR x30  Up2, dwn 1  2x10    x30 ea   PF/DF  M/L x30ea PF/DF  M/L x30ea PF/DF  M/L   Biodex-LOS L8 x2 WALT supp L8 x2   WALT   supp L8 x2 WALT supp L7 x2 WALT supp L7 x1  L5 x1 WALT  L7 x1  L5 x1 WALT  L5 x2 WALT Lv8 x2 UE sup L8 x2 WALT supp L8 x2 WALT supp   Biodex-WS  AP & ML L9 x1  L8 x1 L8 x2  L7 x2    L5 x2    L5, L4 x1 ea    L5, L4 x1 ea  L4 x2 ea Lv 10 x2, ML  Lv9 x2, AP L10 x2 ML  L9x2 AP L10 x2 M/L  L9 A/Px2   Biodex - random L11 Med 1'x2 L10 Med 1'x2  L10 Med 1'x2  L8, L7 med 1'x2 ea L6 med 1'x2 ea L6 med 1'x2 ea L6 med x1  Static med   1' x2 Static med  1'x2 Static  Med 1'x2   Tandem balance Foam EO 1x30" EC 2x30" Foam EO 1x30" EC 2x30" Foam EO 1x30" EC 2x30" Foam EO 1x30" EC 2x30" Foam  EC 2x30" Foam  EC 2x30" nv Foam   EO 1x 30" ea  EC 2x30" ea  Foam  EO 1x30"   EC 2x30" ea Foam EO 1x30" EC2x30" ea   Tandem & side stepping Foam 6laps ea Foam rebounder 4 laps ea  Foam rebouder 4 laps ea  Foam rebouder 4 laps ea  Foam rebouder 4 laps ea  Foam rebouder 5 laps ea Foam rebounder 5 laps ea  Foam 6 laps ea Foam  6 laps ea Foam 6laps ea   KB passes on foam     15# 1x10 15# 1x10 15# x10       Lunge onto BOSU     2x5 ea x10 ea x10 ea      Ankle Clock drill        Foam x5       Standing sl toe touch       Foam x10      Ther Ex    BAPS L3 2 5# x30 ea np np     L2 2 5# x30 ea L2  2 5# x30 ea L2 2 5# x30 ea   TB ankle all purpx 30 ea np np     purp x30 purp x30 purp x30 ea   Prone ankle PF c weight        np np    Leg press c heel raise   44#   2x10 np 88# 2x10 88# 3x10 88# 3x10 88# 3x10                    Ther Activity    bike L4 x10' L4, 10' elliptical L4, 10'  5 bike, 5 ellip 5 bike, 5 ellip 5 bike, 5 ellip 5 bike, 5 ellip L4, 10 min L4x10 min L4 x10'                Gait Training                              Modalities    CP PRN

## 2021-07-08 ENCOUNTER — OFFICE VISIT (OUTPATIENT)
Dept: PHYSICAL THERAPY | Facility: REHABILITATION | Age: 36
End: 2021-07-08
Payer: OTHER MISCELLANEOUS

## 2021-07-08 DIAGNOSIS — S92.315D CLOSED NONDISPLACED FRACTURE OF FIRST METATARSAL BONE OF LEFT FOOT WITH ROUTINE HEALING, SUBSEQUENT ENCOUNTER: Primary | ICD-10-CM

## 2021-07-08 PROCEDURE — 97110 THERAPEUTIC EXERCISES: CPT | Performed by: PHYSICAL THERAPIST

## 2021-07-08 PROCEDURE — 97530 THERAPEUTIC ACTIVITIES: CPT | Performed by: PHYSICAL THERAPIST

## 2021-07-08 PROCEDURE — 97112 NEUROMUSCULAR REEDUCATION: CPT | Performed by: PHYSICAL THERAPIST

## 2021-07-08 NOTE — PROGRESS NOTES
Daily Note     Today's date: 2021  Patient name: Zonia Kyle  : 1985  MRN: [id number]  Referring provider: Violette Brock MD  Dx:   Encounter Diagnosis     ICD-10-CM    1  Closed nondisplaced fracture of first metatarsal bone of left foot with routine healing, subsequent encounter  [id number]                   Subjective: Pt comes to therapy denying pain or discomfort  Denies discomfort following last treatment session  Objective: See treatment diary below      Assessment: Tolerated treatment well  Demonstrating improved stability and eccentric control in left single leg stance  Patient demonstrated fatigue post treatment, exhibited good technique with therapeutic exercises and would benefit from continued PT      Plan: Progress treatment as tolerated         Precautions: Weight-bearing protocol     Daily Treatment Diary     Date 6/14 6/17 6/21 6/24 6/28 7/1 7/6 7/8 6/3 6/8   FOTO     nv     perf   Re-eval               Manuals 6/14 6/14 6/21 6/24 6/28 7/1 7/6 7/8 6/3 6/8   PROM ankle CC JG       CC CC   Great toe ROM  JG                                     Neuro Re-Ed     rockerboard  x30 PF/DF  M/L HR/TR  x30 HR/TR x30  Up2, dwn 1  2x10     x30ea PF/DF  M/L x30ea PF/DF  M/L   Biodex-LOS L8 x2 WALT supp L8 x2   WALT   supp L8 x2 WALT supp L7 x2 WALT supp L7 x1  L5 x1 WALT  L7 x1  L5 x1 WALT  L5 x2 WALT L5 x1  L4 x1 WALT  L8 x2 WALT supp L8 x2 WALT supp   Biodex-WS  AP & ML L9 x1  L8 x1 L8 x2  L7 x2    L5 x2    L5, L4 x1 ea    L5, L4 x1 ea  L4 x2 ea L4 x2 ea L10 x2 ML  L9x2 AP L10 x2 M/L  L9 A/Px2   Biodex - random L11 Med 1'x2 L10 Med 1'x2  L10 Med 1'x2  L8, L7 med 1'x2 ea L6 med 1'x2 ea L6 med 1'x2 ea L6 med x1  L5 med x1  Static med  1'x2 Static  Med 1'x2   Tandem balance Foam EO 1x30" EC 2x30" Foam EO 1x30" EC 2x30" Foam EO 1x30" EC 2x30" Foam EO 1x30" EC 2x30" Foam  EC 2x30" Foam  EC 2x30" nv Foam  EC 2x30" Foam  EO 1x30"   EC 2x30" ea Foam EO 1x30" EC2x30" ea   Tandem & side stepping Foam 6laps ea Foam rebounder 4 laps ea  Foam rebouder 4 laps ea  Foam rebouder 4 laps ea  Foam rebouder 4 laps ea  Foam rebouder 5 laps ea Foam rebounder 5 laps ea  Foam rebounder 5 laps ea  Foam  6 laps ea Foam 6laps ea   KB passes on foam     15# 1x10 15# 1x10 15# x10  15# x15      Lunge onto BOSU     2x5 ea x10 ea x10 ea x15 ea     Ankle Clock drill        Foam x5  Foam x5      Standing sl toe touch       Foam x10 Foam x10     Ther Ex    BAPS L3 2 5# x30 ea np np      L2  2 5# x30 ea L2 2 5# x30 ea   TB ankle all purpx 30 ea np np      purp x30 purp x30 ea   Prone ankle PF c weight         np    Leg press c heel raise   44#   2x10 np 88# 2x10 88# 3x10 88# 3x10 88# 3x10  nv                  Ther Activity    bike L4 x10' L4, 10' elliptical L4, 10'  5 bike, 5 ellip 5 bike, 5 ellip 5 bike, 5 ellip 5 bike, 5 ellip 5 bike, 5 ellip L4x10 min L4 x10'                Gait Training                              Modalities    CP PRN

## 2021-07-12 ENCOUNTER — OFFICE VISIT (OUTPATIENT)
Dept: PHYSICAL THERAPY | Facility: REHABILITATION | Age: 36
End: 2021-07-12
Payer: OTHER MISCELLANEOUS

## 2021-07-12 DIAGNOSIS — S92.315D CLOSED NONDISPLACED FRACTURE OF FIRST METATARSAL BONE OF LEFT FOOT WITH ROUTINE HEALING, SUBSEQUENT ENCOUNTER: Primary | ICD-10-CM

## 2021-07-12 PROCEDURE — 97110 THERAPEUTIC EXERCISES: CPT | Performed by: PHYSICAL THERAPIST

## 2021-07-12 PROCEDURE — 97530 THERAPEUTIC ACTIVITIES: CPT | Performed by: PHYSICAL THERAPIST

## 2021-07-12 PROCEDURE — 97112 NEUROMUSCULAR REEDUCATION: CPT | Performed by: PHYSICAL THERAPIST

## 2021-07-12 NOTE — PROGRESS NOTES
Daily Note     Today's date: 2021  Patient name: Delmy Boone  : 1985  MRN: [id number]  Referring provider: Peter Colmenares MD  Dx:   Encounter Diagnosis     ICD-10-CM    1  Closed nondisplaced fracture of first metatarsal bone of left foot with routine healing, subsequent encounter  [id number]                   Subjective: Pt comes to therapy denying pain or discomfort  Denies discomfort following last treatment session  States he walked upwards of 7 miles at work this past weekend, denying pain, only noting fatigue  Objective: See treatment diary below      Assessment: Tolerated treatment well  Patient demonstrated fatigue post treatment, exhibited good technique with therapeutic exercises and would benefit from continued PT      Plan: Progress treatment as tolerated         Precautions: Weight-bearing protocol     Daily Treatment Diary     Date    FOTO     nv   perf  perf   Re-eval               Manuals    PROM ankle CC JG        CC   Great toe ROM  JG                                     Neuro Re-Ed     rockerboard  x30 PF/DF  M/L HR/TR  x30 HR/TR x30  Up2, dwn 1  2x10      x30ea PF/DF  M/L   Biodex-LOS L8 x2 WALT supp L8 x2   WALT   supp L8 x2 WALT supp L7 x2 WALT supp L7 x1  L5 x1 WALT  L7 x1  L5 x1 WALT  L5 x2 WALT L5 x1  L4 x1 WALT  L4 x1  L3 x1 WALT  L8 x2 WALT supp   Biodex-WS  AP & ML L9 x1  L8 x1 L8 x2  L7 x2    L5 x2    L5, L4 x1 ea    L5, L4 x1 ea  L4 x2 ea L4 x2 ea L4 x1  L3 x1 WALT  L10 x2 M/L  L9 A/Px2   Biodex - random L11 Med 1'x2 L10 Med 1'x2  L10 Med 1'x2  L8, L7 med 1'x2 ea L6 med 1'x2 ea L6 med 1'x2 ea L6 med x1  L5 med x1  L5 med x1 Static  Med 1'x2   Tandem balance Foam EO 1x30" EC 2x30" Foam EO 1x30" EC 2x30" Foam EO 1x30" EC 2x30" Foam EO 1x30" EC 2x30" Foam  EC 2x30" Foam  EC 2x30" nv Foam  EC 2x30" Foam  EC 2x30" Foam EO 1x30" EC2x30" ea   Tandem & side stepping Foam 6laps ea Foam rebounder 4 laps ea  Foam rebouder 4 laps ea  Foam rebouder 4 laps ea  Foam rebouder 4 laps ea  Foam rebouder 5 laps ea Foam rebounder 5 laps ea  Foam rebounder 5 laps ea  Foam rebounder 6 laps ea  Foam 6laps ea   KB passes on foam     15# 1x10 15# 1x10 15# x10  15# x15  15# 2x10    Lunge onto BOSU     2x5 ea x10 ea x10 ea x15 ea 2x10 ea    Ankle Clock drill        Foam x5  Foam x5  Foam x5     Standing sl toe touch       Foam x10 Foam x10 Foam x10    Ther Ex    BAPS L3 2 5# x30 ea np np       L2 2 5# x30 ea   TB ankle all purpx 30 ea np np       purp x30 ea   Prone ankle PF c weight             Leg press c heel raise   44#   2x10 np 88# 2x10 88# 3x10 88# 3x10 88# 3x10  nv                  Ther Activity    bike L4 x10' L4, 10' elliptical L4, 10'  5 bike, 5 ellip 5 bike, 5 ellip 5 bike, 5 ellip 5 bike, 5 ellip 5 bike, 5 ellip  L4 x10'                Gait Training                              Modalities    CP PRN

## 2021-07-15 ENCOUNTER — OFFICE VISIT (OUTPATIENT)
Dept: PHYSICAL THERAPY | Facility: REHABILITATION | Age: 36
End: 2021-07-15
Payer: OTHER MISCELLANEOUS

## 2021-07-15 DIAGNOSIS — S92.315D CLOSED NONDISPLACED FRACTURE OF FIRST METATARSAL BONE OF LEFT FOOT WITH ROUTINE HEALING, SUBSEQUENT ENCOUNTER: Primary | ICD-10-CM

## 2021-07-15 PROCEDURE — 97112 NEUROMUSCULAR REEDUCATION: CPT | Performed by: PHYSICAL THERAPIST

## 2021-07-15 PROCEDURE — 97110 THERAPEUTIC EXERCISES: CPT | Performed by: PHYSICAL THERAPIST

## 2021-07-15 PROCEDURE — 97530 THERAPEUTIC ACTIVITIES: CPT | Performed by: PHYSICAL THERAPIST

## 2021-07-15 NOTE — PROGRESS NOTES
Daily Note     Today's date: 7/15/2021  Patient name: Lisa Dimas  : 1985  MRN: [id number]  Referring provider: Cathy Burks MD  Dx:   Encounter Diagnosis     ICD-10-CM    1  Closed nondisplaced fracture of first metatarsal bone of left foot with routine healing, subsequent encounter  [id number] [id number]                   Subjective: Pt reports to therapy with no new complaints of pain or discomfort in his foot  States he feels he is getting back to how he was pre-injury and he walked 9 miles at work with minimal fatigue  Objective: See treatment diary below      Assessment: Tolerated treatment well  Patient demonstrated fatigue post treatment, exhibited good technique with therapeutic exercises and would benefit from continued PT  Continues to demonstrate improvements in static and dynamic balance  Plan: Continue per plan of care  Progress treatment as tolerated         Precautions: Weight-bearing protocol     Daily Treatment Diary     Date 6/14 6/17 6/21 6/24 6/28 7/1 7/6 7/8 7/12 7/15   FOTO     nv   perf     Re-eval               Manuals 6/14 6/14 6/21 6/24 6/28 7/1 7/6 7/8 7/12 7/15   PROM ankle CC JG           Great toe ROM  JG                                     Neuro Re-Ed     rockerboard  x30 PF/DF  M/L HR/TR  x30 HR/TR x30  Up2, dwn 1  2x10         Biodex-LOS L8 x2 WALT supp L8 x2   WALT   supp L8 x2 WALT supp L7 x2 WALT supp L7 x1  L5 x1 WALT  L7 x1  L5 x1 WALT  L5 x2 WALT L5 x1  L4 x1 WALT  L4 x1  L3 x1 WALT  L3 x1 L2 x1  WALT   Biodex-WS  AP & ML L9 x1  L8 x1 L8 x2  L7 x2    L5 x2    L5, L4 x1 ea    L5, L4 x1 ea  L4 x2 ea L4 x2 ea L4 x1  L3 x1 WALT  L4 x1 L2 x1  no UE    Biodex - random L11 Med 1'x2 L10 Med 1'x2  L10 Med 1'x2  L8, L7 med 1'x2 ea L6 med 1'x2 ea L6 med 1'x2 ea L6 med x1  L5 med x1  L5 med x1 L5 small x2   Tandem balance Foam EO 1x30" EC 2x30" Foam EO 1x30" EC 2x30" Foam EO 1x30" EC 2x30" Foam EO 1x30" EC 2x30" Foam  EC 2x30" Foam  EC 2x30" nv Foam  EC 2x30" Foam  EC 2x30" Foam EC 2x30"    Tandem & side stepping Foam 6laps ea Foam rebounder 4 laps ea  Foam rebouder 4 laps ea  Foam rebouder 4 laps ea  Foam rebouder 4 laps ea  Foam rebouder 5 laps ea Foam rebounder 5 laps ea  Foam rebounder 5 laps ea  Foam rebounder 6 laps ea  Foam rebonder 6 laps ea   KB passes on foam     15# 1x10 15# 1x10 15# x10  15# x15  15# 2x10 15# 2x15   Lunge onto BOSU     2x5 ea x10 ea x10 ea x15 ea 2x10 ea 2x10 ea   Ankle Clock drill        Foam x5  Foam x5  Foam x5  Foam x5   Standing sl toe touch       Foam x10 Foam x10 Foam x10 Foam x15   Ther Ex    BAPS L3 2 5# x30 ea np np          TB ankle all purpx 30 ea np np          Prone ankle PF c weight             Leg press c heel raise   44#   2x10 np 88# 2x10 88# 3x10 88# 3x10 88# 3x10  nv  88# 3x10                 Ther Activity    bike L4 x10' L4, 10' elliptical L4, 10'  5 bike, 5 ellip 5 bike, 5 ellip 5 bike, 5 ellip 5 bike, 5 ellip 5 bike, 5 ellip  5 bike, 5 ellip                 Gait Training                              Modalities    CP PRN

## 2021-07-19 ENCOUNTER — OFFICE VISIT (OUTPATIENT)
Dept: PHYSICAL THERAPY | Facility: REHABILITATION | Age: 36
End: 2021-07-19
Payer: OTHER MISCELLANEOUS

## 2021-07-19 DIAGNOSIS — S92.315D CLOSED NONDISPLACED FRACTURE OF FIRST METATARSAL BONE OF LEFT FOOT WITH ROUTINE HEALING, SUBSEQUENT ENCOUNTER: Primary | ICD-10-CM

## 2021-07-19 PROCEDURE — 97530 THERAPEUTIC ACTIVITIES: CPT | Performed by: PHYSICAL THERAPIST

## 2021-07-19 PROCEDURE — 97110 THERAPEUTIC EXERCISES: CPT | Performed by: PHYSICAL THERAPIST

## 2021-07-19 PROCEDURE — 97112 NEUROMUSCULAR REEDUCATION: CPT | Performed by: PHYSICAL THERAPIST

## 2021-07-19 NOTE — PROGRESS NOTES
Daily Note     Today's date: 2021  Patient name: Jh Trejo  : 1985  MRN: [id number]  Referring provider: Amber Aguero MD  Dx:   Encounter Diagnosis     ICD-10-CM    1  Closed nondisplaced fracture of first metatarsal bone of left foot with routine healing, subsequent encounter  [id number]                   Subjective: patient offers no new complaints, mentions no pain pre treatment      Objective: See treatment diary below      Assessment: Patient tolerated treatment well  Able to progress as noted below with no reports of increased pain  He demonstrates good technique throughout the session  He demonstrates fatigue at end of session and would benefit from continued PT  Plan: Continue per plan of care        Precautions: Weight-bearing protocol     Daily Treatment Diary     Date 7/19  6/21 6/24 6/28 7/1 7/6 7/8 7/12 7/15   FOTO     nv   perf     Re-eval               Manuals 7/19  6/21 6/24 6/28 7/1 7/6 7/8 7/12 7/15   PROM ankle             Great toe ROM                                       Neuro Re-Ed     rockerboard    HR/TR x30  Up2, dwn 1  2x10         Biodex-LOS L2 x1 L1 x1  WALT  L8 x2 WALT supp L7 x2 WALT supp L7 x1  L5 x1 WALT  L7 x1  L5 x1 WALT  L5 x2 WALT L5 x1  L4 x1 WALT  L4 x1  L3 x1 WALT  L3 x1 L2 x1  WALT   Biodex-WS  AP & ML L2 x1 L1 x1  no UE   L7 x2    L5 x2    L5, L4 x1 ea    L5, L4 x1 ea  L4 x2 ea L4 x2 ea L4 x1  L3 x1 WALT  L4 x1 L2 x1  no UE    Biodex - random L5 small x2  L10 Med 1'x2  L8, L7 med 1'x2 ea L6 med 1'x2 ea L6 med 1'x2 ea L6 med x1  L5 med x1  L5 med x1 L5 small x2   Tandem balance Foam EC 2x30"   Foam EO 1x30" EC 2x30" Foam EO 1x30" EC 2x30" Foam  EC 2x30" Foam  EC 2x30" nv Foam  EC 2x30" Foam  EC 2x30" Foam EC 2x30"    Tandem & side stepping Foam rebonder 6 laps ea  Foam rebouder 4 laps ea  Foam rebouder 4 laps ea  Foam rebouder 4 laps ea  Foam rebouder 5 laps ea Foam rebounder 5 laps ea  Foam rebounder 5 laps ea  Foam rebounder 6 laps ea  Foam rebonder 6 laps ea KB passes on foam 15# 2x15     15# 1x10 15# 1x10 15# x10  15# x15  15# 2x10 15# 2x15   Lunge onto BOSU 2x10 ea    2x5 ea x10 ea x10 ea x15 ea 2x10 ea 2x10 ea   Ankle Clock drill  Foam x5       Foam x5  Foam x5  Foam x5  Foam x5   Standing sl toe touch Foam x15      Foam x10 Foam x10 Foam x10 Foam x15   Ther Ex    BAPS   np          TB ankle all   np          Prone ankle PF c weight              Leg press c heel raise  88# 3x10  np 88# 2x10 88# 3x10 88# 3x10 88# 3x10  nv  88# 3x10                 Ther Activity    bike 5 bike, 5 ellip   elliptical L4, 10'  5 bike, 5 ellip 5 bike, 5 ellip 5 bike, 5 ellip 5 bike, 5 ellip 5 bike, 5 ellip  5 bike, 5 ellip                 Gait Training                              Modalities    CP PRN

## 2021-07-22 ENCOUNTER — OFFICE VISIT (OUTPATIENT)
Dept: PHYSICAL THERAPY | Facility: REHABILITATION | Age: 36
End: 2021-07-22
Payer: OTHER MISCELLANEOUS

## 2021-07-22 DIAGNOSIS — S92.315D CLOSED NONDISPLACED FRACTURE OF FIRST METATARSAL BONE OF LEFT FOOT WITH ROUTINE HEALING, SUBSEQUENT ENCOUNTER: Primary | ICD-10-CM

## 2021-07-22 PROCEDURE — 97530 THERAPEUTIC ACTIVITIES: CPT

## 2021-07-22 PROCEDURE — 97112 NEUROMUSCULAR REEDUCATION: CPT

## 2021-07-22 PROCEDURE — 97110 THERAPEUTIC EXERCISES: CPT

## 2021-07-22 NOTE — PROGRESS NOTES
Daily Note     Today's date: 2021  Patient name: Donavon Tidwell  : 1985  MRN: [id number]  Referring provider: Bettie Navarrete MD  Dx:   Encounter Diagnosis     ICD-10-CM    1  Closed nondisplaced fracture of first metatarsal bone of left foot with routine healing, subsequent encounter  [id number]                   Subjective: pt offered no complaints  Objective: See treatment diary below      Assessment: Tolerated treatment well  Intermittent LOB with dynamic balance exercises, but regained by patient  Good challenge with current program  Patient demonstrated fatigue post treatment, exhibited good technique with therapeutic exercises and would benefit from continued PT      Plan: Continue per plan of care  Progress treatment as tolerated         Precautions: Weight-bearing protocol     Daily Treatment Diary     Date 7/19 7/22 6/21 6/24 6/28 7/1 7/6 7/8 7/12 7/15   FOTO     nv   perf     Re-eval               Manuals 7/19 7/22 6/21 6/24 6/28 7/1 7/6 7/8 7/12 7/15   PROM ankle             Great toe ROM                                       Neuro Re-Ed     rockerboard    HR/TR x30  Up2, dwn 1  2x10         Biodex-LOS L2 x1 L1 x1  WALT L3 x1  L2 x1  WALT L8 x2 WALT supp L7 x2 WALT supp L7 x1  L5 x1 WALT  L7 x1  L5 x1 WALT  L5 x2 WALT L5 x1  L4 x1 WALT  L4 x1  L3 x1 WALT  L3 x1 L2 x1  WALT   Biodex-WS  AP & ML L2 x1 L1 x1  no UE  L2 x1 L1 x1  no UE L7 x2    L5 x2    L5, L4 x1 ea    L5, L4 x1 ea  L4 x2 ea L4 x2 ea L4 x1  L3 x1 WALT  L4 x1 L2 x1  no UE    Biodex - random L5 small x2 L5x1 L4x1 small L10 Med 1'x2  L8, L7 med 1'x2 ea L6 med 1'x2 ea L6 med 1'x2 ea L6 med x1  L5 med x1  L5 med x1 L5 small x2   Tandem balance Foam EC 2x30"  Foam EC 2x30" Foam EO 1x30" EC 2x30" Foam EO 1x30" EC 2x30" Foam  EC 2x30" Foam  EC 2x30" nv Foam  EC 2x30" Foam  EC 2x30" Foam EC 2x30"    Tandem & side stepping Foam rebonder 6 laps ea Foam rebonder 6 laps ea Foam rebouder 4 laps ea  Foam rebouder 4 laps ea  Foam rebouder 4 laps ea Foam rebouder 5 laps ea Foam rebounder 5 laps ea  Foam rebounder 5 laps ea  Foam rebounder 6 laps ea  Foam rebonder 6 laps ea   KB passes on foam 15# 2x15  15# 2x15   15# 1x10 15# 1x10 15# x10  15# x15  15# 2x10 15# 2x15   Lunge onto BOSU 2x10 ea 2x10 ea   2x5 ea x10 ea x10 ea x15 ea 2x10 ea 2x10 ea   Ankle Clock drill  Foam x5  Foam x5     Foam x5  Foam x5  Foam x5  Foam x5   Standing sl toe touch Foam x15 Foam x15     Foam x10 Foam x10 Foam x10 Foam x15   Ther Ex    BAPS   np          TB ankle all   np          Prone ankle PF c weight              Leg press c heel raise  88# 3x10 88# 3x10 np 88# 2x10 88# 3x10 88# 3x10 88# 3x10  nv  88# 3x10                 Ther Activity    bike 5 bike, 5 ellip  5 bike, 5 ellip  elliptical L4, 10'  5 bike, 5 ellip 5 bike, 5 ellip 5 bike, 5 ellip 5 bike, 5 ellip 5 bike, 5 ellip  5 bike, 5 ellip                 Gait Training                              Modalities    CP PRN

## 2021-07-26 ENCOUNTER — OFFICE VISIT (OUTPATIENT)
Dept: PHYSICAL THERAPY | Facility: REHABILITATION | Age: 36
End: 2021-07-26
Payer: OTHER MISCELLANEOUS

## 2021-07-26 DIAGNOSIS — S92.315D CLOSED NONDISPLACED FRACTURE OF FIRST METATARSAL BONE OF LEFT FOOT WITH ROUTINE HEALING, SUBSEQUENT ENCOUNTER: Primary | ICD-10-CM

## 2021-07-26 PROCEDURE — 97112 NEUROMUSCULAR REEDUCATION: CPT

## 2021-07-26 PROCEDURE — 97110 THERAPEUTIC EXERCISES: CPT

## 2021-07-26 NOTE — PROGRESS NOTES
Daily Note     Today's date: 2021  Patient name: Hunter Aparicio  : 1985  MRN: [id number]  Referring provider: Jocelyn Guan MD  Dx: No diagnosis found  Subjective: pt offered no complaints  He reported feeling pretty good  Objective: See treatment diary below      Assessment: Tolerated treatment well  Miminal cues for setup  Good recall with current program  Patient demonstrated fatigue post treatment, exhibited good technique with therapeutic exercises and would benefit from continued PT      Plan: Continue per plan of care  Progress treatment as tolerated         Precautions: Weight-bearing protocol     Daily Treatment Diary     Date 7/19 7/22 7/26 6/24 6/28 7/1 7/6 7/8 7/12 7/15   FOTO     nv   perf     Re-eval               Manuals 7/19 7/22 7/26 6/24 6/28 7/1 7/6 7/8 7/12 7/15   PROM ankle             Great toe ROM                                       Neuro Re-Ed     rockerboard      Up2, dwn 1  2x10         Biodex-LOS L2 x1 L1 x1  WALT L3 x1  L2 x1  WALT L2x1  L1x1  WALT  L7 x2 WALT supp L7 x1  L5 x1 WALT  L7 x1  L5 x1 WALT  L5 x2 WALT L5 x1  L4 x1 WALT  L4 x1  L3 x1 WALT  L3 x1 L2 x1  WALT   Biodex-WS  AP & ML L2 x1 L1 x1  no UE  L2 x1 L1 x1  no UE L1 x2  ea   L5 x2    L5, L4 x1 ea    L5, L4 x1 ea  L4 x2 ea L4 x2 ea L4 x1  L3 x1 WALT  L4 x1 L2 x1  no UE    Biodex - random L5 small x2 L5x1 L4x1 small L4 Med 1'x2  L8, L7 med 1'x2 ea L6 med 1'x2 ea L6 med 1'x2 ea L6 med x1  L5 med x1  L5 med x1 L5 small x2   Tandem balance Foam EC 2x30"  Foam EC 2x30" Foam EO 1x30" EC 2x30" Foam EO 1x30" EC 2x30" Foam  EC 2x30" Foam  EC 2x30" nv Foam  EC 2x30" Foam  EC 2x30" Foam EC 2x30"    Tandem & side stepping Foam rebonder 6 laps ea Foam rebonder 6 laps ea Foam rebouder 4 laps ea  Foam rebouder 4 laps ea  Foam rebouder 4 laps ea  Foam rebouder 5 laps ea Foam rebounder 5 laps ea  Foam rebounder 5 laps ea  Foam rebounder 6 laps ea  Foam rebonder 6 laps ea   KB passes on foam 15# 2x15  15# 2x15 15# x30 15# 1x10 15# 1x10 15# x10  15# x15  15# 2x10 15# 2x15   Lunge onto BOSU 2x10 ea 2x10 ea 2x10 ea  2x5 ea x10 ea x10 ea x15 ea 2x10 ea 2x10 ea   Ankle Clock drill  Foam x5  Foam x5 Foam x10    Foam x5  Foam x5  Foam x5  Foam x5   Standing sl toe touch Foam x15 Foam x15 Foam x20    Foam x10 Foam x10 Foam x10 Foam x15   Ther Ex    BAPS             TB ankle all             Prone ankle PF c weight              Leg press c heel raise  88# 3x10 88# 3x10 8# 3x10 88# 2x10 88# 3x10 88# 3x10 88# 3x10  nv  88# 3x10                 Ther Activity    bike 5 bike, 5 ellip  5 bike, 5 ellip  elliptical L4, 10'  5 bike, 5 ellip 5 bike, 5 ellip 5 bike, 5 ellip 5 bike, 5 ellip 5 bike, 5 ellip  5 bike, 5 ellip                 Gait Training                              Modalities    CP PRN

## 2021-07-29 ENCOUNTER — OFFICE VISIT (OUTPATIENT)
Dept: PHYSICAL THERAPY | Facility: REHABILITATION | Age: 36
End: 2021-07-29
Payer: OTHER MISCELLANEOUS

## 2021-07-29 DIAGNOSIS — S92.315D CLOSED NONDISPLACED FRACTURE OF FIRST METATARSAL BONE OF LEFT FOOT WITH ROUTINE HEALING, SUBSEQUENT ENCOUNTER: Primary | ICD-10-CM

## 2021-07-29 PROCEDURE — 97110 THERAPEUTIC EXERCISES: CPT

## 2021-07-29 PROCEDURE — 97112 NEUROMUSCULAR REEDUCATION: CPT

## 2021-07-29 NOTE — PROGRESS NOTES
Daily Note     Today's date: 2021  Patient name: Edmund Rajan  : 1985  MRN: [id number]  Referring provider: Marc Rodriguez MD  Dx:   Encounter Diagnosis     ICD-10-CM    1  Closed nondisplaced fracture of first metatarsal bone of left foot with routine healing, subsequent encounter  [id number]                   Subjective:  Patient reports that he is able to be on his feet for much longer periods of time without having pain  Patient reports that he is able to get into full extension of his toes now without pain  Patient notes that he is not 100% but much improved  Objective: See treatment diary below      Assessment: Tolerated treatment well  Patient demonstrates improving strength and balance as noted during ex preformed  Patient demonstrated fatigue post treatment, exhibited good technique with therapeutic exercises and would benefit from continued PT to maximize ROM, strength, proprioception and tolerance for daily function  Plan: Continue per plan of care        Precautions: Weight-bearing protocol     Daily Treatment Diary     Date 7/19 7/22 7/26 7/29  7/1 7/6 7/8 7/12 7/15   FOTO        perf     Re-eval               Manuals 7/19 7/22 7/26 7/29  7/1 7/6 7/8 7/12 7/15   PROM ankle             Great toe ROM                                       Neuro Re-Ed     rockerboard               Biodex-LOS L2 x1 L1 x1  WALT L3 x1  L2 x1  WALT L2x1  L1x1  WALT  L2x1  L1x2  No UE   L7 x1  L5 x1 WALT  L5 x2 WALT L5 x1  L4 x1 WALT  L4 x1  L3 x1 WALT  L3 x1 L2 x1  WALT   Biodex-WS  AP & ML L2 x1 L1 x1  no UE  L2 x1 L1 x1  no UE L1 x2  ea     L1 x2 ea    L5, L4 x1 ea  L4 x2 ea L4 x2 ea L4 x1  L3 x1 WALT  L4 x1 L2 x1  no UE    Biodex - random L5 small x2 L5x1 L4x1 small L4 Med 1'x2  L1 Med 1'x2 finger touch  L6 med 1'x2 ea L6 med x1  L5 med x1  L5 med x1 L5 small x2   Tandem balance Foam EC 2x30"  Foam EC 2x30" Foam EO 1x30" EC 2x30" Foam EC 2x30"  Foam  EC 2x30" nv Foam  EC 2x30" Foam  EC 2x30" Foam EC 2x30"    Tandem & side stepping Foam rebonder 6 laps ea Foam rebonder 6 laps ea Foam rebouder 4 laps ea  Foam 7# rebounder 6 laps  Foam rebouder 5 laps ea Foam rebounder 5 laps ea  Foam rebounder 5 laps ea  Foam rebounder 6 laps ea  Foam rebonder 6 laps ea   KB passes on foam 15# 2x15  15# 2x15 15# x30 15# x30  15# 1x10 15# x10  15# x15  15# 2x10 15# 2x15   Lunge onto BOSU 2x10 ea 2x10 ea 2x10 ea 2x10 ea  x10 ea x10 ea x15 ea 2x10 ea 2x10 ea   Ankle Clock drill  Foam x5  Foam x5 Foam x10 Foam x10   Foam x5  Foam x5  Foam x5  Foam x5   Standing sl toe touch Foam x15 Foam x15 Foam x20 Foamx20   Foam x10 Foam x10 Foam x10 Foam x15   Ther Ex    BAPS             TB ankle all             Prone ankle PF c weight              Leg press c heel raise  88# 3x10 88# 3x10 8# 3x10 88# 3x10  88# 3x10 88# 3x10  nv  88# 3x10                 Ther Activity    bike 5 bike, 5 ellip  5 bike, 5 ellip  elliptical L4, 10'  Bike 5' ellip x5'  5 bike, 5 ellip 5 bike, 5 ellip 5 bike, 5 ellip  5 bike, 5 ellip                 Gait Training                              Modalities    CP PRN

## 2021-08-02 ENCOUNTER — OFFICE VISIT (OUTPATIENT)
Dept: PHYSICAL THERAPY | Facility: REHABILITATION | Age: 36
End: 2021-08-02
Payer: OTHER MISCELLANEOUS

## 2021-08-02 DIAGNOSIS — S92.315D CLOSED NONDISPLACED FRACTURE OF FIRST METATARSAL BONE OF LEFT FOOT WITH ROUTINE HEALING, SUBSEQUENT ENCOUNTER: Primary | ICD-10-CM

## 2021-08-02 PROCEDURE — 97112 NEUROMUSCULAR REEDUCATION: CPT

## 2021-08-02 PROCEDURE — 97110 THERAPEUTIC EXERCISES: CPT

## 2021-08-02 NOTE — PROGRESS NOTES
Daily Note     Today's date: 2021  Patient name: Chad Phillip  : 1985  MRN: [id number]  Referring provider: Vanesa Henson MD  Dx:   Encounter Diagnosis     ICD-10-CM    1  Closed nondisplaced fracture of first metatarsal bone of left foot with routine healing, subsequent encounter  R86 [id number]                   Subjective:  Pt has no new complaints to offer and reports he is feeling pretty much back to normal  Continues to be able to achieve full extension of toes with no problems  He is off of light duty at work as of yesterday  Objective: See treatment diary below      Assessment: Tolerated treatment well  Continues to make improvements in strength and with proprioception during balance interventions  Patient demonstrated fatigue post treatment, exhibited good technique with therapeutic exercises and would benefit from continued PT to continue improving ROM, strength, proprioception and function with ADLs  Plan: Continue per plan of care        Precautions: Weight-bearing protocol     Daily Treatment Diary     Date 7/19 7/22 7/26 7/29 8/2 7/1 7/6 7/8 7/12 7/15   FOTO        perf     Re-eval               Manuals 7/19 7/22 7/26 7/29 8/2 7/1 7/6 7/8 7/12 7/15   PROM ankle             Great toe ROM                                       Neuro Re-Ed     rockerboard               Biodex-LOS L2 x1 L1 x1  WALT L3 x1  L2 x1  WALT L2x1  L1x1  WALT  L2x1  L1x2  No UE  L2x1  L1x2  No UE L7 x1  L5 x1 WALT  L5 x2 WALT L5 x1  L4 x1 WALT  L4 x1  L3 x1 WALT  L3 x1 L2 x1  WALT   Biodex-WS  AP & ML L2 x1 L1 x1  no UE  L2 x1 L1 x1  no UE L1 x2  ea     L1 x2 ea   L1 x 2 ea L5, L4 x1 ea  L4 x2 ea L4 x2 ea L4 x1  L3 x1 WALT  L4 x1 L2 x1  no UE    Biodex - random L5 small x2 L5x1 L4x1 small L4 Med 1'x2  L1 Med 1'x2 finger touch L1 med  1'x2   no UE L6 med 1'x2 ea L6 med x1  L5 med x1  L5 med x1 L5 small x2   Tandem balance Foam EC 2x30"  Foam EC 2x30" Foam EO 1x30" EC 2x30" Foam EC 2x30" Foam  EC  2x30" Foam  EC 2x30" nv Foam  EC 2x30" Foam  EC 2x30" Foam EC 2x30"    Tandem & side stepping Foam rebonder 6 laps ea Foam rebonder 6 laps ea Foam rebouder 4 laps ea  Foam 7# rebounder 6 laps Foam  7#  rebounder 6 laps Foam rebouder 5 laps ea Foam rebounder 5 laps ea  Foam rebounder 5 laps ea  Foam rebounder 6 laps ea  Foam rebonder 6 laps ea   KB passes on foam 15# 2x15  15# 2x15 15# x30 15# x30 15#  x40 15# 1x10 15# x10  15# x15  15# 2x10 15# 2x15   Lunge onto BOSU 2x10 ea 2x10 ea 2x10 ea 2x10 ea 2x10  ea x10 ea x10 ea x15 ea 2x10 ea 2x10 ea   Ankle Clock drill  Foam x5  Foam x5 Foam x10 Foam x10 Foam  x10  Foam x5  Foam x5  Foam x5  Foam x5   Standing sl toe touch Foam x15 Foam x15 Foam x20 Foamx20 Foam x20  Foam x10 Foam x10 Foam x10 Foam x15   Ther Ex    BAPS             TB ankle all             Prone ankle PF c weight              Leg press c heel raise  88# 3x10 88# 3x10 8# 3x10 88# 3x10 88#  3x10 88# 3x10 88# 3x10  nv  88# 3x10                 Ther Activity    bike 5 bike, 5 ellip  5 bike, 5 ellip  elliptical L4, 10'  Bike 5' ellip x5' Bike 5'  ellip 5' 5 bike, 5 ellip 5 bike, 5 ellip 5 bike, 5 ellip  5 bike, 5 ellip                 Gait Training                              Modalities    CP PRN

## 2021-08-04 ENCOUNTER — TELEPHONE (OUTPATIENT)
Dept: OBGYN CLINIC | Facility: HOSPITAL | Age: 36
End: 2021-08-04

## 2021-08-05 ENCOUNTER — OFFICE VISIT (OUTPATIENT)
Dept: PHYSICAL THERAPY | Facility: REHABILITATION | Age: 36
End: 2021-08-05
Payer: OTHER MISCELLANEOUS

## 2021-08-05 DIAGNOSIS — S92.315D CLOSED NONDISPLACED FRACTURE OF FIRST METATARSAL BONE OF LEFT FOOT WITH ROUTINE HEALING, SUBSEQUENT ENCOUNTER: Primary | ICD-10-CM

## 2021-08-05 PROCEDURE — 97110 THERAPEUTIC EXERCISES: CPT

## 2021-08-05 PROCEDURE — 97112 NEUROMUSCULAR REEDUCATION: CPT

## 2021-08-05 NOTE — PROGRESS NOTES
Daily Note     Today's date: 2021  Patient name: Hortencia Almonte  : 1985  MRN: [id number]  Referring provider: Jessie Barcenas MD  Dx:   Encounter Diagnosis     ICD-10-CM    1  Closed nondisplaced fracture of first metatarsal bone of left foot with routine healing, subsequent encounter  H99 039I                   Subjective: pt reports his left foot is feeling good  He denied pain and/or soreness at present time as well as following last visit  Pt reports no current limitations with ADL's  He noted performing work duties with no pain or difficulty  He wishes to make today his last treatment  Objective: See treatment diary below      Assessment: Tolerated treatment well  Good recall with current program  Patient demonstrated fatigue post treatment and exhibited good technique with therapeutic exercises  Denied pain and/or difficulty throughout treatment  Plan: pt currently discharged to from skilled physical therapy       Precautions: Weight-bearing protocol     Daily Treatment Diary     Date 7/19 7/22 7/26 7/29 8/2 8/5  7/8 7/12 7/15   FOTO        perf     Re-eval               Manuals 7/19 7/22 7/26 7/29 8/2 8/5  7/8 7/12 7/15   PROM ankle             Great toe ROM                                       Neuro Re-Ed     rockerboard               Biodex-LOS L2 x1 L1 x1  WALT L3 x1  L2 x1  WALT L2x1  L1x1  WALT  L2x1  L1x2  No UE  L2x1  L1x2  No UE L1 x2   No UE   L5 x1  L4 x1 WALT  L4 x1  L3 x1 WALT  L3 x1 L2 x1  WALT   Biodex-WS  AP & ML L2 x1 L1 x1  no UE  L2 x1 L1 x1  no UE L1 x2  ea     L1 x2 ea   L1 x 2 ea L1 x2 ea   L4 x2 ea L4 x1  L3 x1 WALT  L4 x1 L2 x1  no UE    Biodex - random L5 small x2 L5x1 L4x1 small L4 Med 1'x2  L1 Med 1'x2 finger touch L1 med  1'x2   no UE L1  1'x2 No UE  L5 med x1  L5 med x1 L5 small x2   Tandem balance Foam EC 2x30"  Foam EC 2x30" Foam EO 1x30" EC 2x30" Foam EC 2x30" Foam  EC  2x30" Foam  EC 2x30"  Foam  EC 2x30" Foam  EC 2x30" Foam EC 2x30"    Tandem & side stepping Foam rebonder 6 laps ea Foam rebonder 6 laps ea Foam rebouder 4 laps ea  Foam 7# rebounder 6 laps Foam  7#  rebounder 6 laps Foam rebouder 7# 6 laps ea  Foam rebounder 5 laps ea  Foam rebounder 6 laps ea  Foam rebonder 6 laps ea   KB passes on foam 15# 2x15  15# 2x15 15# x30 15# x30 15#  x40 20# 2x20  15# x15  15# 2x10 15# 2x15   Lunge onto BOSU 2x10 ea 2x10 ea 2x10 ea 2x10 ea 2x10  ea 2x10 ea  x15 ea 2x10 ea 2x10 ea   Ankle Clock drill  Foam x5  Foam x5 Foam x10 Foam x10 Foam  x10 Foam  x10  Foam x5  Foam x5  Foam x5   Standing sl toe touch Foam x15 Foam x15 Foam x20 Foamx20 Foam x20 Foam x20  Foam x10 Foam x10 Foam x15   Ther Ex    BAPS             TB ankle all             Prone ankle PF c weight              Leg press c heel raise  88# 3x10 88# 3x10 8# 3x10 88# 3x10 88#  3x10 110# 2x10 88# 3x10  nv  88# 3x10                 Ther Activity    bike 5 bike, 5 ellip  5 bike, 5 ellip  elliptical L4, 10'  Bike 5' ellip x5' Bike 5'  ellip 5' 5 bike, 5 ellip 5 bike, 5 ellip 5 bike, 5 ellip  5 bike, 5 ellip                 Gait Training                              Modalities    CP PRN

## 2021-09-14 ENCOUNTER — APPOINTMENT (OUTPATIENT)
Dept: RADIOLOGY | Facility: AMBULARY SURGERY CENTER | Age: 36
End: 2021-09-14
Attending: ORTHOPAEDIC SURGERY
Payer: OTHER MISCELLANEOUS

## 2021-09-14 ENCOUNTER — OFFICE VISIT (OUTPATIENT)
Dept: OBGYN CLINIC | Facility: CLINIC | Age: 36
End: 2021-09-14
Payer: OTHER MISCELLANEOUS

## 2021-09-14 VITALS
HEART RATE: 76 BPM | BODY MASS INDEX: 26.83 KG/M2 | WEIGHT: 177 LBS | HEIGHT: 68 IN | SYSTOLIC BLOOD PRESSURE: 115 MMHG | DIASTOLIC BLOOD PRESSURE: 81 MMHG

## 2021-09-14 DIAGNOSIS — S97.82XD CRUSHING INJURY OF LEFT FOOT, SUBSEQUENT ENCOUNTER: Primary | ICD-10-CM

## 2021-09-14 DIAGNOSIS — S97.82XD CRUSHING INJURY OF LEFT FOOT, SUBSEQUENT ENCOUNTER: ICD-10-CM

## 2021-09-14 PROCEDURE — 99213 OFFICE O/P EST LOW 20 MIN: CPT | Performed by: ORTHOPAEDIC SURGERY

## 2021-09-14 PROCEDURE — 73630 X-RAY EXAM OF FOOT: CPT

## 2021-09-14 NOTE — PROGRESS NOTES
Searcy Sandifer, M D  Attending, Orthopaedic Surgery  Foot and [address] [facility] [address] [facility]      ORTHOPAEDIC FOOT AND ANKLE CLINIC VISIT     Assessment:     Encounter Diagnosis   Name Primary?  Crushing injury of left foot, subsequent encounter Yes            Plan:   · The patient verbalized understanding of exam findings and treatment plan  We engaged in the shared decision-making process and treatment options were discussed at length with the patient  Surgical and conservative management discussed today along with risks and benefits  · Joi Ott has well healed 1st and 2nd metatarsal fractures seen on xray today  · He does not have any restrictions at this point  · Patient declined work note today  Return if symptoms worsen or fail to improve  History of Present Illness:   Chief Complaint:   Chief Complaint   Patient presents with   Grey Barkley is a 43 y o  male who is being seen in follow-up for left foot, 1st and 2nd metatarsal fractures sustained at work on 3/30/2021  When we last saw he we recommended WBAT in a sneaker and return to work without restrictions 8/1/2021  Pain has improved  Residual pain is localized at none  He has not had issues returning to work  Pain/symptom timing:  Worse during the day when active  Pain/symptom context:  Worse with activites and work  Pain/symptom modifying factors:  Rest makes better, activities make worse  Pain/symptom associated signs/symptoms: none    Prior treatment   · NSAIDsNo   · Injections No   · Bracing/Orthotics Yes    · Physical Therapy Yes     Orthopedic Surgical History:   See below    Past Medical, Surgical and Social History:  Past Medical History:  has no past medical history on file  Problem List: does not have any pertinent problems on file  Past Surgical History:  has no past surgical history on file    Family History: family history includes Cancer in his maternal grandfather and maternal grandmother; Heart disease in his paternal uncle  Social History:  reports that he has never smoked  He has never used smokeless tobacco  He reports that he does not drink alcohol and does not use drugs  Current Medications: has a current medication list which includes the following prescription(s): aspirin  Allergies: has No Known Allergies  Review of Systems:  General- denies fever/chills  HEENT- denies hearing loss or sore throat  Eyes- denies eye pain or visual disturbances, denies red eyes  Respiratory- denies cough or SOB  Cardio- denies chest pain or palpitations  GI- denies abdominal pain  Endocrine- denies urinary frequency  Urinary- denies pain with urination  Musculoskeletal- Negative except noted above  Skin- denies rashes or wounds  Neurological- denies dizziness or headache  Psychiatric- denies anxiety or difficulty concentrating    Physical Exam:   /81 (BP Location: Right arm, Patient Position: Sitting, Cuff Size: Adult)   Pulse 76   Ht 5' 8" (1 727 m)   Wt 80 3 kg (177 lb)   BMI 26 91 kg/m²   General/Constitutional: No apparent distress: well-nourished and well developed  Eyes: normal ocular motion  Lymphatic: No appreciable lymphadenopathy  Respiratory: Non-labored breathing  Vascular: No edema, swelling or tenderness, except as noted in detailed exam   Integumentary: No impressive skin lesions present, except as noted in detailed exam   Neuro: No ataxia or tremors noted  Psych: Normal mood and affect, oriented to person, place and time  Appropriate affect  Musculoskeletal: Normal, except as noted in detailed exam and in HPI      Examination    Left    Gait                Normal   Musculoskeletal Tender to palpation at none    Skin Normal       Nails Normal    Range of Motion  20 degrees dorsiflexion, 40 degrees plantarflexion  Subtalar motion: normal    Stability Stable    Muscle Strength 5/5 tibialis anterior  5/5 gastrocnemius-soleus  5/5 posterior tibialis  5/5 peroneal/eversion strength  5/5 EHL  5/5 FHL    Neurologic Normal    Sensation Intact to light touch throughout sural, saphenous, superficial peroneal, deep peroneal and medial/lateral plantar nerve distributions  [place]-Ellis 5 07 filament (10g) testing deferred  Cardiovascular Brisk capillary refill < 2 seconds,intact DP and PT pulses    Special Tests None      Imaging Studies:     3 views of the Left foot were obtained, reviewed and interpreted independently which demonstrate well healed 1st and 2nd metatarsal fractures  Reviewed by me personally  Halbert Blalock Lachman, MD  Foot & Ankle Surgery   Department of [address] [place] [address]      I personally performed the service  Halbert Blalock Lachman, MD    Scribe Attestation    I,:  Nazario Hartmann MA am acting as a scribe while in the presence of the attending physician :       I,:  Izzy Lynch MD personally performed the services described in this documentation    as scribed in my presence :

## 2021-09-16 ENCOUNTER — TELEPHONE (OUTPATIENT)
Dept: OBGYN CLINIC | Facility: HOSPITAL | Age: 36
End: 2021-09-16

## 2021-09-16 NOTE — TELEPHONE ENCOUNTER
Patient sees Dr Shabnam Witt from [employer] is calling for an updated work note for the patient from his visit on 9/14 to be faxed over       Fax # [phone]  Attn: Claim # [id number]

## 2022-06-29 NOTE — PATIENT INSTRUCTIONS
NOnweightbearing  in CAM boot for 2 weeks  Do not need to wear the boot for sleep or showering but should wear it any time you are walking on it  Recommend taking the following supplements: Vitamin D 4000 units per day and Calcium 1200 mg per day  This will help with bone healing  Avoid NSAIDs like Motrin/Aleve/Ibuprofen  Avoid steroids/nicotine products/ rheumatoid medications like DMARDs if possible  Aspirin BID for blood clot prevention  Script provided      Knee high compression stocking 20/30mm HG of pressure
100% of the time/able to follow single-step instructions